# Patient Record
Sex: FEMALE | Race: WHITE | NOT HISPANIC OR LATINO
[De-identification: names, ages, dates, MRNs, and addresses within clinical notes are randomized per-mention and may not be internally consistent; named-entity substitution may affect disease eponyms.]

---

## 2020-06-11 ENCOUNTER — ERX REFILL RESPONSE (OUTPATIENT)
Age: 35
End: 2020-06-11

## 2020-06-11 RX ORDER — PANTOPRAZOLE SODIUM 40 MG/1
TAKE 1 TABLET BY MOUTH ONCE A DAY TABLET, DELAYED RELEASE ORAL
Qty: 30 | Refills: 2

## 2020-12-31 ENCOUNTER — OFFICE VISIT (OUTPATIENT)
Dept: URBAN - METROPOLITAN AREA CLINIC 12 | Facility: CLINIC | Age: 35
End: 2020-12-31

## 2021-02-04 ENCOUNTER — OFFICE VISIT (OUTPATIENT)
Dept: URBAN - METROPOLITAN AREA CLINIC 12 | Facility: CLINIC | Age: 36
End: 2021-02-04
Payer: COMMERCIAL

## 2021-02-04 VITALS
DIASTOLIC BLOOD PRESSURE: 81 MMHG | HEIGHT: 65 IN | TEMPERATURE: 97.3 F | HEART RATE: 89 BPM | BODY MASS INDEX: 27.79 KG/M2 | WEIGHT: 166.8 LBS | SYSTOLIC BLOOD PRESSURE: 138 MMHG

## 2021-02-04 DIAGNOSIS — R11.0 NAUSEA: ICD-10-CM

## 2021-02-04 DIAGNOSIS — R10.31 RLQ ABDOMINAL PAIN: ICD-10-CM

## 2021-02-04 DIAGNOSIS — K31.84 GASTROPARESIS: ICD-10-CM

## 2021-02-04 DIAGNOSIS — Z79.1 NSAID LONG-TERM USE: ICD-10-CM

## 2021-02-04 PROCEDURE — G9903 PT SCRN TBCO ID AS NON USER: HCPCS | Performed by: INTERNAL MEDICINE

## 2021-02-04 PROCEDURE — 99214 OFFICE O/P EST MOD 30 MIN: CPT | Performed by: INTERNAL MEDICINE

## 2021-02-04 PROCEDURE — G8482 FLU IMMUNIZE ORDER/ADMIN: HCPCS | Performed by: INTERNAL MEDICINE

## 2021-02-04 PROCEDURE — G8427 DOCREV CUR MEDS BY ELIG CLIN: HCPCS | Performed by: INTERNAL MEDICINE

## 2021-02-04 PROCEDURE — G8420 CALC BMI NORM PARAMETERS: HCPCS | Performed by: INTERNAL MEDICINE

## 2021-02-04 RX ORDER — ETONOGESTREL AND ETHINYL ESTRADIOL .12; .015 MG/D; MG/D
INSERT 1 VAGINAL RING BY VAGINAL ROUTE ONCE A MONTH LEAVE IN PLACE FOR 3 WEEKS, REMOVE FOR 1 WEEK INSERT, EXTENDED RELEASE VAGINAL
Qty: 1 | Refills: 0 | Status: ON HOLD | COMMUNITY
Start: 1900-01-01

## 2021-02-04 RX ORDER — TAPENTADOL HYDROCHLORIDE 50 MG/1
1 TABLET TABLET, FILM COATED ORAL
Status: ACTIVE | COMMUNITY

## 2021-02-04 RX ORDER — PANTOPRAZOLE SODIUM 40 MG/1
TAKE 1 TABLET BY MOUTH ONCE A DAY TABLET, DELAYED RELEASE ORAL
Qty: 30 | Refills: 2 | Status: ON HOLD | COMMUNITY

## 2021-02-04 RX ORDER — PANTOPRAZOLE SODIUM 40 MG/1
1 TABLET TABLET, DELAYED RELEASE ORAL 1
Qty: 90 | Refills: 3 | OUTPATIENT
Start: 2021-02-04

## 2021-02-04 RX ORDER — ONDANSETRON 8 MG/1
1 TABLET ON THE TONGUE AND ALLOW TO DISSOLVE TABLET, ORALLY DISINTEGRATING ORAL EVERY 6 HOURS
Qty: 360 TABLET | Refills: 3 | OUTPATIENT
Start: 2021-02-04

## 2021-02-04 RX ORDER — LEVOTHYROXINE SODIUM 0.07 MG/1
1 TABLET IN THE MORNING ON AN EMPTY STOMACH TABLET ORAL ONCE A DAY
Status: ACTIVE | COMMUNITY

## 2021-02-04 RX ORDER — DIPHENHYDRAMINE HCL 2 %
CREAM (GRAM) TOPICAL
Qty: 0 | Refills: 0 | Status: ON HOLD | COMMUNITY
Start: 1900-01-01

## 2021-02-04 RX ORDER — PREGABALIN 150 MG/1
1 CAPSULE CAPSULE ORAL ONCE A DAY
Status: ACTIVE | COMMUNITY

## 2021-02-04 NOTE — PHYSICAL EXAM NECK/THYROID:
normal appearance , without tenderness upon palpation , no deformities , trachea midline , Thyroid normal size , no thyroid nodules , no masses , no JVD , thyroid nontender [place] [facility] called for consult

## 2021-02-04 NOTE — HPI-TODAY'S VISIT:
35-year-old female presented for follow up for chronic reflux epigastric pain and history of gastroparesis. The patient reports still has heartburn epigastric pain mostly after she eats she recently started on Diclofenac  for arthritis and back  pain, she had complicated past medical history including p postural orthostatic tachycardia syndrome ,Siezure . She has history of for Mariano-Danlos syndrome. She has history of cholecystectomy complicated with a bile leak.  she had  Upper endoscopy and colonoscopy in 2019 her upper endoscopy revealed the ulcer in the duodenum her colonoscopy revealed anal fissure. she has post-prandial nasuea and bloating

## 2022-02-08 ENCOUNTER — ERX REFILL RESPONSE (OUTPATIENT)
Dept: URBAN - METROPOLITAN AREA CLINIC 23 | Facility: CLINIC | Age: 37
End: 2022-02-08

## 2022-02-08 RX ORDER — PANTOPRAZOLE 40 MG/1
TAKE 1 TABLET ONCE A DAY TABLET, DELAYED RELEASE ORAL
Qty: 90 TABLET | Refills: 4 | OUTPATIENT

## 2022-02-08 RX ORDER — PANTOPRAZOLE SODIUM 40 MG/1
1 TABLET TABLET, DELAYED RELEASE ORAL 1
Qty: 90 | Refills: 3 | OUTPATIENT

## 2022-05-08 ENCOUNTER — OFFICE VISIT (OUTPATIENT)
Dept: URBAN - METROPOLITAN AREA CLINIC 111 | Facility: CLINIC | Age: 37
End: 2022-05-08

## 2022-05-11 ENCOUNTER — WEB ENCOUNTER (OUTPATIENT)
Dept: URBAN - METROPOLITAN AREA CLINIC 111 | Facility: CLINIC | Age: 37
End: 2022-05-11

## 2022-05-11 ENCOUNTER — LAB OUTSIDE AN ENCOUNTER (OUTPATIENT)
Dept: URBAN - METROPOLITAN AREA CLINIC 111 | Facility: CLINIC | Age: 37
End: 2022-05-11

## 2022-05-11 ENCOUNTER — OFFICE VISIT (OUTPATIENT)
Dept: URBAN - METROPOLITAN AREA CLINIC 111 | Facility: CLINIC | Age: 37
End: 2022-05-11
Payer: COMMERCIAL

## 2022-05-11 DIAGNOSIS — R11.0 NAUSEA: ICD-10-CM

## 2022-05-11 DIAGNOSIS — R93.5 ABNORMAL CT OF THE ABDOMEN: ICD-10-CM

## 2022-05-11 DIAGNOSIS — Z95.0 PACEMAKER: ICD-10-CM

## 2022-05-11 DIAGNOSIS — K31.84 GASTROPARESIS: ICD-10-CM

## 2022-05-11 PROCEDURE — 99214 OFFICE O/P EST MOD 30 MIN: CPT | Performed by: INTERNAL MEDICINE

## 2022-05-11 RX ORDER — PREGABALIN 150 MG/1
1 CAPSULE CAPSULE ORAL ONCE A DAY
Status: ACTIVE | COMMUNITY

## 2022-05-11 RX ORDER — ERYTHROMYCIN 250 MG/1
ONE CAPSULE TABLET, DELAYED RELEASE ORAL THREE TIMES A DAY
Qty: 21 | Refills: 3 | OUTPATIENT

## 2022-05-11 RX ORDER — ETONOGESTREL AND ETHINYL ESTRADIOL .12; .015 MG/D; MG/D
INSERT 1 VAGINAL RING BY VAGINAL ROUTE ONCE A MONTH LEAVE IN PLACE FOR 3 WEEKS, REMOVE FOR 1 WEEK INSERT, EXTENDED RELEASE VAGINAL
Qty: 1 | Refills: 0 | Status: ON HOLD | COMMUNITY
Start: 1900-01-01

## 2022-05-11 RX ORDER — PANTOPRAZOLE 40 MG/1
TAKE 1 TABLET ONCE A DAY TABLET, DELAYED RELEASE ORAL
Qty: 90 TABLET | Refills: 4 | Status: ACTIVE | COMMUNITY

## 2022-05-11 RX ORDER — ONDANSETRON 8 MG/1
1 TABLET ON THE TONGUE AND ALLOW TO DISSOLVE TABLET, ORALLY DISINTEGRATING ORAL EVERY 6 HOURS
Qty: 360 TABLET | Refills: 3 | Status: ACTIVE | COMMUNITY
Start: 2021-02-04

## 2022-05-11 RX ORDER — LEVOTHYROXINE SODIUM 0.07 MG/1
1 TABLET IN THE MORNING ON AN EMPTY STOMACH TABLET ORAL ONCE A DAY
Status: ACTIVE | COMMUNITY

## 2022-05-11 RX ORDER — TAPENTADOL HYDROCHLORIDE 50 MG/1
1 TABLET TABLET, FILM COATED ORAL
Status: ACTIVE | COMMUNITY

## 2022-05-11 RX ORDER — DIPHENHYDRAMINE HCL 2 %
CREAM (GRAM) TOPICAL
Qty: 0 | Refills: 0 | Status: ON HOLD | COMMUNITY
Start: 1900-01-01

## 2022-05-11 NOTE — HPI-TODAY'S VISIT:
36-year-old female with complicated past ocular history including Mariano-Danlos syndrome history of pacemaker presented today for follow-up after recent emergency room visit May 5, 2022 for abdominal pain distention nausea she has CT scan in the emergency room revealed significant gastric distention consistent with a gastroparesis flare.  Her lab were unremarkable she is here today for follow-up she continued to have nausea bloating she is only tolerating liquid.  She cannot take Reglan because of side effect.  She had    she had complicated past medical history including p postural orthostatic tachycardia syndrome ,Siezure . She has history of for Mariano-Danlos syndrome. She has history of cholecystectomy complicated with a bile leak.  she had  Upper endoscopy and colonoscopy in 2019 her upper endoscopy revealed the ulcer in the duodenum her colonoscopy revealed anal fissure. She noted recently had this dysphagia and difficulty swallowing mostly for solid food

## 2022-05-13 PROBLEM — 441509002: Status: ACTIVE | Noted: 2022-05-13

## 2022-05-13 PROBLEM — 235675006 GASTROPARESIS: Status: ACTIVE | Noted: 2021-02-04

## 2022-05-27 ENCOUNTER — OFFICE VISIT (OUTPATIENT)
Dept: URBAN - METROPOLITAN AREA MEDICAL CENTER 27 | Facility: MEDICAL CENTER | Age: 37
End: 2022-05-27

## 2022-06-17 ENCOUNTER — OFFICE VISIT (OUTPATIENT)
Dept: URBAN - METROPOLITAN AREA MEDICAL CENTER 27 | Facility: MEDICAL CENTER | Age: 37
End: 2022-06-17

## 2022-06-17 PROBLEM — 40739000 DYSPHAGIA: Status: ACTIVE | Noted: 2022-05-11

## 2022-06-28 ENCOUNTER — OFFICE VISIT (OUTPATIENT)
Dept: URBAN - METROPOLITAN AREA MEDICAL CENTER 27 | Facility: MEDICAL CENTER | Age: 37
End: 2022-06-28
Payer: COMMERCIAL

## 2022-06-28 DIAGNOSIS — K29.60 ADENOPAPILLOMATOSIS GASTRICA: ICD-10-CM

## 2022-06-28 DIAGNOSIS — R13.19 CERVICAL DYSPHAGIA: ICD-10-CM

## 2022-06-28 DIAGNOSIS — R93.3 ABN FINDINGS-GI TRACT: ICD-10-CM

## 2022-06-28 PROCEDURE — 43239 EGD BIOPSY SINGLE/MULTIPLE: CPT | Performed by: INTERNAL MEDICINE

## 2022-06-28 RX ORDER — ONDANSETRON 8 MG/1
1 TABLET ON THE TONGUE AND ALLOW TO DISSOLVE TABLET, ORALLY DISINTEGRATING ORAL EVERY 6 HOURS
Qty: 360 TABLET | Refills: 3 | Status: ACTIVE | COMMUNITY
Start: 2021-02-04

## 2022-06-28 RX ORDER — DIPHENHYDRAMINE HCL 2 %
CREAM (GRAM) TOPICAL
Qty: 0 | Refills: 0 | Status: ON HOLD | COMMUNITY
Start: 1900-01-01

## 2022-06-28 RX ORDER — ETONOGESTREL AND ETHINYL ESTRADIOL .12; .015 MG/D; MG/D
INSERT 1 VAGINAL RING BY VAGINAL ROUTE ONCE A MONTH LEAVE IN PLACE FOR 3 WEEKS, REMOVE FOR 1 WEEK INSERT, EXTENDED RELEASE VAGINAL
Qty: 1 | Refills: 0 | Status: ON HOLD | COMMUNITY
Start: 1900-01-01

## 2022-06-28 RX ORDER — TAPENTADOL HYDROCHLORIDE 50 MG/1
1 TABLET TABLET, FILM COATED ORAL
Status: ACTIVE | COMMUNITY

## 2022-06-28 RX ORDER — ERYTHROMYCIN 250 MG/1
ONE CAPSULE TABLET, DELAYED RELEASE ORAL THREE TIMES A DAY
Qty: 21 | Refills: 3 | Status: ACTIVE | COMMUNITY

## 2022-06-28 RX ORDER — PREGABALIN 150 MG/1
1 CAPSULE CAPSULE ORAL ONCE A DAY
Status: ACTIVE | COMMUNITY

## 2022-06-28 RX ORDER — LEVOTHYROXINE SODIUM 0.07 MG/1
1 TABLET IN THE MORNING ON AN EMPTY STOMACH TABLET ORAL ONCE A DAY
Status: ACTIVE | COMMUNITY

## 2022-06-28 RX ORDER — PANTOPRAZOLE 40 MG/1
TAKE 1 TABLET ONCE A DAY TABLET, DELAYED RELEASE ORAL
Qty: 90 TABLET | Refills: 4 | Status: ACTIVE | COMMUNITY

## 2022-07-22 ENCOUNTER — OFFICE VISIT (OUTPATIENT)
Dept: URBAN - METROPOLITAN AREA MEDICAL CENTER 27 | Facility: MEDICAL CENTER | Age: 37
End: 2022-07-22

## 2022-11-03 ENCOUNTER — ERX REFILL RESPONSE (OUTPATIENT)
Dept: URBAN - METROPOLITAN AREA CLINIC 23 | Facility: CLINIC | Age: 37
End: 2022-11-03

## 2022-11-03 RX ORDER — ONDANSETRON 8 MG/1
ALLOW 1 TABLET TO DISSOLVE ON THE TONGUE EVERY 6 HOURS AS NEEDED FOR NAUSEA/VOMITING TABLET, ORALLY DISINTEGRATING ORAL
Qty: 30 TABLET | Refills: 1 | OUTPATIENT

## 2022-11-03 RX ORDER — ONDANSETRON 8 MG/1
ALLOW 1 TABLET TO DISSOLVE ON THE TONGUE EVERY 6 HOURS AS NEEDED FOR NAUSEA/VOMITING TABLET, ORALLY DISINTEGRATING ORAL
Qty: 30 TABLET | Refills: 2 | OUTPATIENT

## 2023-03-07 ENCOUNTER — ERX REFILL RESPONSE (OUTPATIENT)
Dept: URBAN - METROPOLITAN AREA CLINIC 23 | Facility: CLINIC | Age: 38
End: 2023-03-07

## 2023-03-07 RX ORDER — PANTOPRAZOLE 40 MG/1
TAKE 1 TABLET ONCE A DAY TABLET, DELAYED RELEASE ORAL
Qty: 90 TABLET | Refills: 4 | OUTPATIENT

## 2023-03-07 RX ORDER — PANTOPRAZOLE SODIUM 40 MG/1
TAKE 1 TABLET ONCE A DAY TABLET, DELAYED RELEASE ORAL
Qty: 90 TABLET | Refills: 3 | OUTPATIENT

## 2023-05-15 ENCOUNTER — ERX REFILL RESPONSE (OUTPATIENT)
Dept: URBAN - METROPOLITAN AREA CLINIC 23 | Facility: CLINIC | Age: 38
End: 2023-05-15

## 2023-05-15 RX ORDER — ONDANSETRON 8 MG/1
ALLOW 1 TABLET TO DISSOLVE ON THE TONGUE EVERY 6 HOURS AS NEEDED FOR NAUSEA/VOMITING TABLET, ORALLY DISINTEGRATING ORAL
Qty: 30 TABLET | Refills: 1 | OUTPATIENT

## 2023-08-18 ENCOUNTER — APPOINTMENT (RX ONLY)
Dept: URBAN - METROPOLITAN AREA OTHER 8 | Facility: OTHER | Age: 38
Setting detail: DERMATOLOGY
End: 2023-08-18

## 2023-08-18 DIAGNOSIS — L663 OTHER SPECIFIED DISEASES OF HAIR AND HAIR FOLLICLES: ICD-10-CM

## 2023-08-18 DIAGNOSIS — D18.0 HEMANGIOMA: ICD-10-CM

## 2023-08-18 DIAGNOSIS — D485 NEOPLASM OF UNCERTAIN BEHAVIOR OF SKIN: ICD-10-CM

## 2023-08-18 DIAGNOSIS — L738 OTHER SPECIFIED DISEASES OF HAIR AND HAIR FOLLICLES: ICD-10-CM

## 2023-08-18 DIAGNOSIS — B07.0 PLANTAR WART: ICD-10-CM

## 2023-08-18 DIAGNOSIS — L73.9 FOLLICULAR DISORDER, UNSPECIFIED: ICD-10-CM

## 2023-08-18 DIAGNOSIS — Z12.83 ENCOUNTER FOR SCREENING FOR MALIGNANT NEOPLASM OF SKIN: ICD-10-CM

## 2023-08-18 DIAGNOSIS — L70.8 OTHER ACNE: ICD-10-CM

## 2023-08-18 PROBLEM — D18.01 HEMANGIOMA OF SKIN AND SUBCUTANEOUS TISSUE: Status: ACTIVE | Noted: 2023-08-18

## 2023-08-18 PROBLEM — D48.5 NEOPLASM OF UNCERTAIN BEHAVIOR OF SKIN: Status: ACTIVE | Noted: 2023-08-18

## 2023-08-18 PROBLEM — L02.426 FURUNCLE OF LEFT LOWER LIMB: Status: ACTIVE | Noted: 2023-08-18

## 2023-08-18 PROBLEM — L02.425 FURUNCLE OF RIGHT LOWER LIMB: Status: ACTIVE | Noted: 2023-08-18

## 2023-08-18 PROBLEM — L02.32 FURUNCLE OF BUTTOCK: Status: ACTIVE | Noted: 2023-08-18

## 2023-08-18 PROCEDURE — ? LIQUID NITROGEN

## 2023-08-18 PROCEDURE — 99203 OFFICE O/P NEW LOW 30 MIN: CPT | Mod: 25

## 2023-08-18 PROCEDURE — 17110 DESTRUCTION B9 LES UP TO 14: CPT

## 2023-08-18 PROCEDURE — ? COUNSELING

## 2023-08-18 PROCEDURE — ? PRESCRIPTION MEDICATION MANAGEMENT

## 2023-08-18 PROCEDURE — ? PRESCRIPTION

## 2023-08-18 RX ORDER — CLINDAMYCIN PHOSPHATE 10 MG/ML
LOTION TOPICAL
Qty: 60 | Refills: 2 | Status: ERX | COMMUNITY
Start: 2023-08-18

## 2023-08-18 RX ORDER — CLASCOTERONE 1 G/100G
CREAM TOPICAL TWICE A DAY
Qty: 60 | Refills: 2 | Status: ERX | COMMUNITY
Start: 2023-08-18

## 2023-08-18 RX ADMIN — CLASCOTERONE: 1 CREAM TOPICAL at 00:00

## 2023-08-18 RX ADMIN — CLINDAMYCIN PHOSPHATE: 10 LOTION TOPICAL at 00:00

## 2023-08-18 ASSESSMENT — LOCATION SIMPLE DESCRIPTION DERM
LOCATION SIMPLE: RIGHT CHEEK
LOCATION SIMPLE: LEFT PLANTAR SURFACE
LOCATION SIMPLE: LEFT UPPER BACK
LOCATION SIMPLE: LOWER BACK
LOCATION SIMPLE: LEFT CHEEK
LOCATION SIMPLE: LEFT BUTTOCK
LOCATION SIMPLE: ABDOMEN
LOCATION SIMPLE: LEFT POPLITEAL SKIN
LOCATION SIMPLE: RIGHT CALF

## 2023-08-18 ASSESSMENT — LOCATION DETAILED DESCRIPTION DERM
LOCATION DETAILED: RIGHT LATERAL BUCCAL CHEEK
LOCATION DETAILED: LEFT LATERAL ABDOMEN
LOCATION DETAILED: SUPERIOR LUMBAR SPINE
LOCATION DETAILED: LEFT INFERIOR UPPER BACK
LOCATION DETAILED: LEFT CENTRAL BUCCAL CHEEK
LOCATION DETAILED: LEFT POPLITEAL SKIN
LOCATION DETAILED: LEFT PLANTAR FOREFOOT OVERLYING 2ND METATARSAL
LOCATION DETAILED: RIGHT PROXIMAL CALF
LOCATION DETAILED: LEFT BUTTOCK

## 2023-08-18 ASSESSMENT — LOCATION ZONE DERM
LOCATION ZONE: LEG
LOCATION ZONE: TRUNK
LOCATION ZONE: FACE
LOCATION ZONE: FEET

## 2023-08-18 NOTE — PROCEDURE: COUNSELING
Detail Level: Detailed
Detail Level: Zone
Sarecycline Pregnancy And Lactation Text: This medication is Pregnancy Category D and not consider safe during pregnancy. It is also excreted in breast milk.
Bactrim Pregnancy And Lactation Text: This medication is Pregnancy Category D and is known to cause fetal risk.  It is also excreted in breast milk.
Azelaic Acid Counseling: Patient counseled that medicine may cause skin irritation and to avoid applying near the eyes.  In the event of skin irritation, the patient was advised to reduce the amount of the drug applied or use it less frequently.   The patient verbalized understanding of the proper use and possible adverse effects of azelaic acid.  All of the patient's questions and concerns were addressed.
Tazorac Pregnancy And Lactation Text: This medication is not safe during pregnancy. It is unknown if this medication is excreted in breast milk.
Erythromycin Counseling:  I discussed with the patient the risks of erythromycin including but not limited to GI upset, allergic reaction, drug rash, diarrhea, increase in liver enzymes, and yeast infections.
Isotretinoin Counseling: Patient should get monthly blood tests, not donate blood, not drive at night if vision affected, not share medication, and not undergo elective surgery for 6 months after tx completed. Side effects reviewed, pt to contact office should one occur.
Topical Retinoid counseling:  Patient advised to apply a pea-sized amount only at bedtime and wait 30 minutes after washing their face before applying.  If too drying, patient may add a non-comedogenic moisturizer. The patient verbalized understanding of the proper use and possible adverse effects of retinoids.  All of the patient's questions and concerns were addressed.
Birth Control Pills Pregnancy And Lactation Text: This medication should be avoided if pregnant and for the first 30 days post-partum.
Benzoyl Peroxide Counseling: Patient counseled that medicine may cause skin irritation and bleach clothing.  In the event of skin irritation, the patient was advised to reduce the amount of the drug applied or use it less frequently.   The patient verbalized understanding of the proper use and possible adverse effects of benzoyl peroxide.  All of the patient's questions and concerns were addressed.
Topical Clindamycin Pregnancy And Lactation Text: This medication is Pregnancy Category B and is considered safe during pregnancy. It is unknown if it is excreted in breast milk.
Azithromycin Pregnancy And Lactation Text: This medication is considered safe during pregnancy and is also secreted in breast milk.
Topical Retinoid Pregnancy And Lactation Text: This medication is Pregnancy Category C. It is unknown if this medication is excreted in breast milk.
Doxycycline Counseling:  Patient counseled regarding possible photosensitivity and increased risk for sunburn.  Patient instructed to avoid sunlight, if possible.  When exposed to sunlight, patients should wear protective clothing, sunglasses, and sunscreen.  The patient was instructed to call the office immediately if the following severe adverse effects occur:  hearing changes, easy bruising/bleeding, severe headache, or vision changes.  The patient verbalized understanding of the proper use and possible adverse effects of doxycycline.  All of the patient's questions and concerns were addressed.
High Dose Vitamin A Pregnancy And Lactation Text: High dose vitamin A therapy is contraindicated during pregnancy and breast feeding.
Tetracycline Counseling: Patient counseled regarding possible photosensitivity and increased risk for sunburn.  Patient instructed to avoid sunlight, if possible.  When exposed to sunlight, patients should wear protective clothing, sunglasses, and sunscreen.  The patient was instructed to call the office immediately if the following severe adverse effects occur:  hearing changes, easy bruising/bleeding, severe headache, or vision changes.  The patient verbalized understanding of the proper use and possible adverse effects of tetracycline.  All of the patient's questions and concerns were addressed. Patient understands to avoid pregnancy while on therapy due to potential birth defects.
Benzoyl Peroxide Pregnancy And Lactation Text: This medication is Pregnancy Category C. It is unknown if benzoyl peroxide is excreted in breast milk.
Isotretinoin Pregnancy And Lactation Text: This medication is Pregnancy Category X and is considered extremely dangerous during pregnancy. It is unknown if it is excreted in breast milk.
Dapsone Counseling: I discussed with the patient the risks of dapsone including but not limited to hemolytic anemia, agranulocytosis, rashes, methemoglobinemia, kidney failure, peripheral neuropathy, headaches, GI upset, and liver toxicity.  Patients who start dapsone require monitoring including baseline LFTs and weekly CBCs for the first month, then every month thereafter.  The patient verbalized understanding of the proper use and possible adverse effects of dapsone.  All of the patient's questions and concerns were addressed.
Winlevi Counseling:  I discussed with the patient the risks of topical clascoterone including but not limited to erythema, scaling, itching, and stinging. Patient voiced their understanding.
Include Pregnancy/Lactation Warning?: No
Aklief counseling:  Patient advised to apply a pea-sized amount only at bedtime and wait 30 minutes after washing their face before applying.  If too drying, patient may add a non-comedogenic moisturizer.  The most commonly reported side effects including irritation, redness, scaling, dryness, stinging, burning, itching, and increased risk of sunburn.  The patient verbalized understanding of the proper use and possible adverse effects of retinoids.  All of the patient's questions and concerns were addressed.
Azelaic Acid Pregnancy And Lactation Text: This medication is considered safe during pregnancy and breast feeding.
Topical Clindamycin Counseling: Patient counseled that this medication may cause skin irritation or allergic reactions.  In the event of skin irritation, the patient was advised to reduce the amount of the drug applied or use it less frequently.   The patient verbalized understanding of the proper use and possible adverse effects of clindamycin.  All of the patient's questions and concerns were addressed.
Erythromycin Pregnancy And Lactation Text: This medication is Pregnancy Category B and is considered safe during pregnancy. It is also excreted in breast milk.
Birth Control Pills Counseling: Birth Control Pill Counseling: I discussed with the patient the potential side effects of OCPs including but not limited to increased risk of stroke, heart attack, thrombophlebitis, deep venous thrombosis, hepatic adenomas, breast changes, GI upset, headaches, and depression.  The patient verbalized understanding of the proper use and possible adverse effects of OCPs. All of the patient's questions and concerns were addressed.
Sarecycline Counseling: Patient advised regarding possible photosensitivity and discoloration of the teeth, skin, lips, tongue and gums.  Patient instructed to avoid sunlight, if possible.  When exposed to sunlight, patients should wear protective clothing, sunglasses, and sunscreen.  The patient was instructed to call the office immediately if the following severe adverse effects occur:  hearing changes, easy bruising/bleeding, severe headache, or vision changes.  The patient verbalized understanding of the proper use and possible adverse effects of sarecycline.  All of the patient's questions and concerns were addressed.
Aklief Pregnancy And Lactation Text: It is unknown if this medication is safe to use during pregnancy.  It is unknown if this medication is excreted in breast milk.  Breastfeeding women should use the topical cream on the smallest area of the skin for the shortest time needed while breastfeeding.  Do not apply to nipple and areola.
Topical Sulfur Applications Counseling: Topical Sulfur Counseling: Patient counseled that this medication may cause skin irritation or allergic reactions.  In the event of skin irritation, the patient was advised to reduce the amount of the drug applied or use it less frequently.   The patient verbalized understanding of the proper use and possible adverse effects of topical sulfur application.  All of the patient's questions and concerns were addressed.
Spironolactone Counseling: Patient advised regarding risks of diarrhea, abdominal pain, hyperkalemia, birth defects (for female patients), liver toxicity and renal toxicity. The patient may need blood work to monitor liver and kidney function and potassium levels while on therapy. The patient verbalized understanding of the proper use and possible adverse effects of spironolactone.  All of the patient's questions and concerns were addressed.
Azithromycin Counseling:  I discussed with the patient the risks of azithromycin including but not limited to GI upset, allergic reaction, drug rash, diarrhea, and yeast infections.
Dapsone Pregnancy And Lactation Text: This medication is Pregnancy Category C and is not considered safe during pregnancy or breast feeding.
Topical Sulfur Applications Pregnancy And Lactation Text: This medication is Pregnancy Category C and has an unknown safety profile during pregnancy. It is unknown if this topical medication is excreted in breast milk.
High Dose Vitamin A Counseling: Side effects reviewed, pt to contact office should one occur.
Spironolactone Pregnancy And Lactation Text: This medication can cause feminization of the male fetus and should be avoided during pregnancy. The active metabolite is also found in breast milk.
Winlevi Pregnancy And Lactation Text: This medication is considered safe during pregnancy and breastfeeding.
Tazorac Counseling:  Patient advised that medication is irritating and drying.  Patient may need to apply sparingly and wash off after an hour before eventually leaving it on overnight.  The patient verbalized understanding of the proper use and possible adverse effects of tazorac.  All of the patient's questions and concerns were addressed.
Doxycycline Pregnancy And Lactation Text: This medication is Pregnancy Category D and not consider safe during pregnancy. It is also excreted in breast milk but is considered safe for shorter treatment courses.
Minocycline Counseling: Patient advised regarding possible photosensitivity and discoloration of the teeth, skin, lips, tongue and gums.  Patient instructed to avoid sunlight, if possible.  When exposed to sunlight, patients should wear protective clothing, sunglasses, and sunscreen.  The patient was instructed to call the office immediately if the following severe adverse effects occur:  hearing changes, easy bruising/bleeding, severe headache, or vision changes.  The patient verbalized understanding of the proper use and possible adverse effects of minocycline.  All of the patient's questions and concerns were addressed.
Bactrim Counseling:  I discussed with the patient the risks of sulfa antibiotics including but not limited to GI upset, allergic reaction, drug rash, diarrhea, dizziness, photosensitivity, and yeast infections.  Rarely, more serious reactions can occur including but not limited to aplastic anemia, agranulocytosis, methemoglobinemia, blood dyscrasias, liver or kidney failure, lung infiltrates or desquamative/blistering drug rashes.
Detail Level: Generalized
Detail Level: Simple

## 2023-08-18 NOTE — PROCEDURE: LIQUID NITROGEN
Show Spray Paint Technique Variable?: Yes
Detail Level: Detailed
Application Tool (Optional): Liquid Nitrogen Sprayer
Consent: The patient's consent was obtained including but not limited to risks of crusting, scabbing, blistering, scarring, darker or lighter pigmentary change, recurrence, incomplete removal and infection.
Medical Necessity Information: It is in your best interest to select a reason for this procedure from the list below. All of these items fulfill various CMS LCD requirements except the new and changing color options.
Medical Necessity Clause: This procedure was medically necessary because the lesions that were treated were:
Add 52 Modifier (Optional): no
Spray Paint Text: The liquid nitrogen was applied to the skin utilizing a spray paint frosting technique.
Number Of Freeze-Thaw Cycles: 2 freeze-thaw cycles
Duration Of Freeze Thaw-Cycle (Seconds): 5-10
Post-Care Instructions: I reviewed with the patient in detail post-care instructions. Patient is to wear sunprotection, and avoid picking at any of the treated lesions. Pt may apply Vaseline to crusted or scabbing areas.
Number Of Freeze-Thaw Cycles: 1 freeze-thaw cycle

## 2023-08-18 NOTE — PROCEDURE: PRESCRIPTION MEDICATION MANAGEMENT
Initiate Treatment: clindamycin 1 % lotion Apply to affected area on the left lower leg daily until resolved.\\n\\nRecommended otc benzoyl peroxide was in the shower \\n\\nRecommended cleansing razor blade with alcohol and shaving in the direction the hair grows in.
Render In Strict Bullet Format?: No
Detail Level: Zone

## 2023-08-18 NOTE — HPI: SKIN LESION
What Type Of Note Output Would You Prefer (Optional)?: Standard Output
How Severe Is Your Skin Lesion?: mild
Is This A New Presentation, Or A Follow-Up?: Skin Lesion
Additional History: Patient has tried treating with otc compound W
Has Your Skin Lesion Been Treated?: not been treated
Is This A New Presentation, Or A Follow-Up?: Mole

## 2024-01-29 ENCOUNTER — OFFICE VISIT (OUTPATIENT)
Dept: URBAN - METROPOLITAN AREA CLINIC 111 | Facility: CLINIC | Age: 39
End: 2024-01-29
Payer: COMMERCIAL

## 2024-01-29 ENCOUNTER — LAB OUTSIDE AN ENCOUNTER (OUTPATIENT)
Dept: URBAN - METROPOLITAN AREA CLINIC 111 | Facility: CLINIC | Age: 39
End: 2024-01-29

## 2024-01-29 VITALS
SYSTOLIC BLOOD PRESSURE: 115 MMHG | HEIGHT: 65 IN | DIASTOLIC BLOOD PRESSURE: 79 MMHG | HEART RATE: 109 BPM | BODY MASS INDEX: 33.99 KG/M2 | TEMPERATURE: 97.5 F | WEIGHT: 204 LBS

## 2024-01-29 DIAGNOSIS — R13.19 CERVICAL DYSPHAGIA: ICD-10-CM

## 2024-01-29 DIAGNOSIS — K64.8 EXTERNAL HEMORRHOIDS: ICD-10-CM

## 2024-01-29 DIAGNOSIS — R74.8 ABNORMAL ALKALINE PHOSPHATASE TEST: ICD-10-CM

## 2024-01-29 DIAGNOSIS — K62.5 RECTAL BLEEDING: ICD-10-CM

## 2024-01-29 PROBLEM — 40739000: Status: ACTIVE | Noted: 2024-01-29

## 2024-01-29 PROBLEM — 14760008: Status: ACTIVE | Noted: 2024-01-29

## 2024-01-29 PROCEDURE — 99214 OFFICE O/P EST MOD 30 MIN: CPT | Performed by: PHYSICIAN ASSISTANT

## 2024-01-29 RX ORDER — PREGABALIN 150 MG/1
1 CAPSULE CAPSULE ORAL ONCE A DAY
Status: ACTIVE | COMMUNITY

## 2024-01-29 RX ORDER — PANTOPRAZOLE SODIUM 40 MG/1
TAKE 1 TABLET ONCE A DAY TABLET, DELAYED RELEASE ORAL
Qty: 90 TABLET | Refills: 3 | Status: ACTIVE | COMMUNITY

## 2024-01-29 RX ORDER — ERYTHROMYCIN 250 MG/1
ONE CAPSULE TABLET, DELAYED RELEASE ORAL THREE TIMES A DAY
Qty: 21 | Refills: 3 | Status: ACTIVE | COMMUNITY

## 2024-01-29 RX ORDER — TAPENTADOL HYDROCHLORIDE 50 MG/1
1 TABLET TABLET, FILM COATED ORAL
Status: ACTIVE | COMMUNITY

## 2024-01-29 RX ORDER — LEVOTHYROXINE SODIUM 0.07 MG/1
1 TABLET IN THE MORNING ON AN EMPTY STOMACH TABLET ORAL ONCE A DAY
Status: ACTIVE | COMMUNITY

## 2024-01-29 RX ORDER — ETONOGESTREL AND ETHINYL ESTRADIOL .12; .015 MG/D; MG/D
INSERT 1 VAGINAL RING BY VAGINAL ROUTE ONCE A MONTH LEAVE IN PLACE FOR 3 WEEKS, REMOVE FOR 1 WEEK INSERT, EXTENDED RELEASE VAGINAL
Qty: 1 | Refills: 0 | Status: ON HOLD | COMMUNITY
Start: 1900-01-01

## 2024-01-29 RX ORDER — DIPHENHYDRAMINE HCL 2 %
CREAM (GRAM) TOPICAL
Qty: 0 | Refills: 0 | Status: ON HOLD | COMMUNITY
Start: 1900-01-01

## 2024-01-29 RX ORDER — ONDANSETRON 8 MG/1
ALLOW 1 TABLET TO DISSOLVE ON THE TONGUE EVERY 6 HOURS AS NEEDED FOR NAUSEA/VOMITING TABLET, ORALLY DISINTEGRATING ORAL
Qty: 30 TABLET | Refills: 1 | Status: ACTIVE | COMMUNITY

## 2024-01-29 NOTE — PHYSICAL EXAM GASTROINTESTINAL
Abdomen, soft, mild tenderness diffusely, nondistended, no guarding or rigidity, no masses palpable, normal bowel sounds, Liver and Spleen, no hepatosplenomegaly

## 2024-01-29 NOTE — HPI-TODAY'S VISIT:
37 y/o female here with rectal bleeding. She has complicated PMH including postural orthostatic tachycardia syndrome, seizure, and Mariano-Danlos syndrome. She has history of cholecystectomy complicated with a bile leak and needed ERCP. S/p colon by Dr. Calderón in 10/2019 for hematochezia revealing anal fissure and non-bleeding internal hemorrhoids. S/p EGD in 6/2022 revealing mild gastritis. Path unimpressive.  Pt has a pacemaker designed for POTS. Sees Dr. Mathis.  Pt is here with her . States she is not doing well and worried about forgetting stuff d/t brain injury. Having a lot of rectal bleeding and happening for at least a year. She states she has had bad hemorrhoids. Also reports itching and pain at times. She has tried creams. Usually constipated d/t pain meds. Having a stool about 3 times a week. She takes miralax if she goes 2 days without a stool. Does not eat meat but eats fish. Tries to eat a lot of fiber. Sometimes stool is hard and she will need to strain. Having abd pain and it has worsened in the last 2 days. Pain moves around. No weight loss but reports a lot of weight gain.  Reports birth father had colon cancer as well as adoptive father. Alk phos has been going up she states.  Reports a lot of fatigue.   No blood thinners.  Having some heartburn. Following a low reflux diet. Takes PPI daily. Having dysphagia for a couple of years. Noticed it initially with pills. Will need to drink water to help. Sometimes will vomit. She had dysphagia back when she had the EGD in 2022 as well. It has not gotten better.  Reports h/o ulcer and feels some pain that was similar to when she had her ulcer.

## 2024-02-02 ENCOUNTER — APPOINTMENT (RX ONLY)
Dept: URBAN - METROPOLITAN AREA OTHER 8 | Facility: OTHER | Age: 39
Setting detail: DERMATOLOGY
End: 2024-02-02

## 2024-02-02 DIAGNOSIS — L73.9 FOLLICULAR DISORDER, UNSPECIFIED: ICD-10-CM

## 2024-02-02 DIAGNOSIS — R21 RASH AND OTHER NONSPECIFIC SKIN ERUPTION: ICD-10-CM

## 2024-02-02 DIAGNOSIS — L738 OTHER SPECIFIED DISEASES OF HAIR AND HAIR FOLLICLES: ICD-10-CM

## 2024-02-02 DIAGNOSIS — L663 OTHER SPECIFIED DISEASES OF HAIR AND HAIR FOLLICLES: ICD-10-CM

## 2024-02-02 PROBLEM — L02.821 FURUNCLE OF HEAD [ANY PART, EXCEPT FACE]: Status: ACTIVE | Noted: 2024-02-02

## 2024-02-02 PROCEDURE — ? PRESCRIPTION MEDICATION MANAGEMENT

## 2024-02-02 PROCEDURE — ? TREATMENT REGIMEN

## 2024-02-02 PROCEDURE — ? PRESCRIPTION

## 2024-02-02 PROCEDURE — ? COUNSELING

## 2024-02-02 PROCEDURE — 11104 PUNCH BX SKIN SINGLE LESION: CPT

## 2024-02-02 PROCEDURE — 99213 OFFICE O/P EST LOW 20 MIN: CPT | Mod: 25

## 2024-02-02 PROCEDURE — ? BIOPSY BY PUNCH METHOD FOR DIF

## 2024-02-02 PROCEDURE — ? BIOPSY BY PUNCH METHOD

## 2024-02-02 PROCEDURE — 11105 PUNCH BX SKIN EA SEP/ADDL: CPT

## 2024-02-02 RX ORDER — ERYTHROMYCIN 20 MG/ML
SOLUTION TOPICAL
Qty: 60 | Refills: 1 | Status: ERX | COMMUNITY
Start: 2024-02-02

## 2024-02-02 RX ORDER — MUPIROCIN 20 MG/G
OINTMENT TOPICAL
Qty: 22 | Refills: 1 | Status: ERX | COMMUNITY
Start: 2024-02-02

## 2024-02-02 RX ORDER — TRIAMCINOLONE ACETONIDE 1 MG/G
OINTMENT TOPICAL BID
Qty: 454 | Refills: 1 | Status: ERX | COMMUNITY
Start: 2024-02-02

## 2024-02-02 RX ADMIN — ERYTHROMYCIN: 20 SOLUTION TOPICAL at 00:00

## 2024-02-02 RX ADMIN — MUPIROCIN: 20 OINTMENT TOPICAL at 00:00

## 2024-02-02 RX ADMIN — TRIAMCINOLONE ACETONIDE: 1 OINTMENT TOPICAL at 00:00

## 2024-02-02 ASSESSMENT — LOCATION DETAILED DESCRIPTION DERM
LOCATION DETAILED: RIGHT ANTERIOR PROXIMAL THIGH
LOCATION DETAILED: LEFT ANTERIOR PROXIMAL THIGH
LOCATION DETAILED: LEFT ANTERIOR PROXIMAL UPPER ARM
LOCATION DETAILED: RIGHT CENTRAL POSTAURICULAR SKIN
LOCATION DETAILED: RIGHT ANTERIOR SHOULDER
LOCATION DETAILED: LEFT RIB CAGE

## 2024-02-02 ASSESSMENT — LOCATION SIMPLE DESCRIPTION DERM
LOCATION SIMPLE: SCALP
LOCATION SIMPLE: LEFT UPPER ARM
LOCATION SIMPLE: LEFT THIGH
LOCATION SIMPLE: RIGHT SHOULDER
LOCATION SIMPLE: ABDOMEN
LOCATION SIMPLE: RIGHT THIGH

## 2024-02-02 ASSESSMENT — LOCATION ZONE DERM
LOCATION ZONE: TRUNK
LOCATION ZONE: SCALP
LOCATION ZONE: LEG
LOCATION ZONE: ARM

## 2024-02-02 NOTE — PROCEDURE: BIOPSY BY PUNCH METHOD
Body Location Override (Optional - Billing Will Still Be Based On Selected Body Map Location If Applicable): left inferior anterior proximal thigh
Detail Level: Detailed
Was A Bandage Applied: Yes
Punch Size In Mm: 4
Size Of Lesion In Cm (Optional): 0
Depth Of Punch Biopsy: dermis
Biopsy Type: H and E
Anesthesia Type: bacteriostatic saline
Anesthesia Volume In Cc: 0.5
Hemostasis: None
Epidermal Sutures: 4-0 Ethilon
Wound Care: Mupirocin
Dressing: bandage
Suture Removal: 14 days
Patient Will Remove Sutures At Home?: No
Lab: [id number]
Lab Facility: [id number]
Consent: Written consent was obtained and risks were reviewed including but not limited to scarring, infection, bleeding, scabbing, incomplete removal, nerve damage and allergy to anesthesia.
Post-Care Instructions: I reviewed with the patient in detail post-care instructions. Patient is to keep the biopsy site dry overnight, and then apply bacitracin twice daily until healed. Patient may apply hydrogen peroxide soaks to remove any crusting.
Home Suture Removal Text: Patient was provided a home suture removal kit and will remove their sutures at home.  If they have any questions or difficulties they will call the office.
Notification Instructions: Patient will be notified of biopsy results. However, patient instructed to call the office if not contacted within 2 weeks.
Billing Type: Third-Party Bill
Information: Selecting Yes will display possible errors in your note based on the variables you have selected. This validation is only offered as a suggestion for you. PLEASE NOTE THAT THE VALIDATION TEXT WILL BE REMOVED WHEN YOU FINALIZE YOUR NOTE. IF YOU WANT TO FAX A PRELIMINARY NOTE YOU WILL NEED TO TOGGLE THIS TO 'NO' IF YOU DO NOT WANT IT IN YOUR FAXED NOTE.

## 2024-02-02 NOTE — HPI: RASH
What Type Of Note Output Would You Prefer (Optional)?: Bullet Format
How Severe Is Your Rash?: mild
Is This A New Presentation, Or A Follow-Up?: Rash
Additional History: Winnie positive lab test but negative cascade blood test. \\nRash comes and goes. Rash changes locations on the hour\\nRsv 8 weeks ago

## 2024-02-02 NOTE — PROCEDURE: PRESCRIPTION MEDICATION MANAGEMENT
Initiate Treatment: erythromycin solution apply twice daily to inflamed blemish on scalp
Detail Level: Zone
Render In Strict Bullet Format?: No
Initiate Treatment: Mupirocin ointment twice daily to bx sites until healed \\nTMC 0.1% ointment twice daily to rash on body until improved

## 2024-02-02 NOTE — PROCEDURE: BIOPSY BY PUNCH METHOD FOR DIF
Body Location Override (Optional - Billing Will Still Be Based On Selected Body Map Location If Applicable): left superior anterior proximal thigh
Detail Level: Detailed
Was A Bandage Applied: Yes
Punch Size In Mm: 4
Size Of Lesion In Cm (Optional): 0
Depth Of Punch Biopsy: dermis
Biopsy Type: DIF
Anesthesia Type: bacteriostatic saline
Anesthesia Volume In Cc: 0.5
Hemostasis: None
Epidermal Sutures: 4-0 Ethilon
Wound Care: Petrolatum
Dressing: bandage
Suture Removal: 14 days
Patient Will Remove Sutures At Home?: No
Consent: Written consent was obtained and risks were reviewed including but not limited to scarring, infection, bleeding, scabbing, incomplete removal, nerve damage and allergy to anesthesia.
Post-Care Instructions: I reviewed with the patient in detail post-care instructions. Patient is to keep the biopsy site dry overnight, and then apply bacitracin twice daily until healed. Patient may apply hydrogen peroxide soaks to remove any crusting.
Home Suture Removal Text: Patient was provided a home suture removal kit and will remove their sutures at home.  If they have any questions or difficulties they will call the office.
Notification Instructions: Patient will be notified of biopsy results. However, patient instructed to call the office if not contacted within 2 weeks.
Billing Type: Third-Party Bill

## 2024-02-06 LAB
ALBUMIN/GLOBULIN RATIO: 1.7
ALBUMIN: 4.4
ALKALINE PHOSPHATASE: 200
ALT (SGPT): 19
AST (SGOT): 18
BILIRUBIN, DIRECT: 0.1
BILIRUBIN, INDIRECT: 0.3
BILIRUBIN, TOTAL: 0.4
GGT: 23
GLOBULIN: 2.6
PROTEIN, TOTAL: 7

## 2024-02-16 ENCOUNTER — APPOINTMENT (RX ONLY)
Dept: URBAN - METROPOLITAN AREA OTHER 8 | Facility: OTHER | Age: 39
Setting detail: DERMATOLOGY
End: 2024-02-16

## 2024-02-16 DIAGNOSIS — Z48.02 ENCOUNTER FOR REMOVAL OF SUTURES: ICD-10-CM

## 2024-02-16 DIAGNOSIS — L50.1 IDIOPATHIC URTICARIA: ICD-10-CM | Status: RESOLVED

## 2024-02-16 PROCEDURE — ? ADDITIONAL NOTES

## 2024-02-16 PROCEDURE — ? SUTURE REMOVAL (NO GLOBAL PERIOD)

## 2024-02-16 PROCEDURE — ? PATHOLOGY DISCUSSION

## 2024-02-16 PROCEDURE — ? COUNSELING

## 2024-02-16 PROCEDURE — 99213 OFFICE O/P EST LOW 20 MIN: CPT

## 2024-02-16 PROCEDURE — ? PRESCRIPTION MEDICATION MANAGEMENT

## 2024-02-16 ASSESSMENT — LOCATION DETAILED DESCRIPTION DERM
LOCATION DETAILED: LEFT ANTERIOR PROXIMAL THIGH
LOCATION DETAILED: RIGHT ANTERIOR PROXIMAL THIGH
LOCATION DETAILED: PERIUMBILICAL SKIN
LOCATION DETAILED: LEFT ANTERIOR DISTAL UPPER ARM
LOCATION DETAILED: RIGHT ANTERIOR DISTAL UPPER ARM

## 2024-02-16 ASSESSMENT — LOCATION ZONE DERM
LOCATION ZONE: TRUNK
LOCATION ZONE: LEG
LOCATION ZONE: ARM

## 2024-02-16 ASSESSMENT — LOCATION SIMPLE DESCRIPTION DERM
LOCATION SIMPLE: LEFT THIGH
LOCATION SIMPLE: RIGHT THIGH
LOCATION SIMPLE: RIGHT UPPER ARM
LOCATION SIMPLE: LEFT UPPER ARM
LOCATION SIMPLE: ABDOMEN

## 2024-02-16 NOTE — PROCEDURE: ADDITIONAL NOTES
Additional Notes: Pt asked provider if she could take out her own sutures. Provider gave the ok but under the supervision of the present MA
Render Risk Assessment In Note?: no
Detail Level: Simple

## 2024-02-16 NOTE — PROCEDURE: SUTURE REMOVAL (NO GLOBAL PERIOD)
Detail Level: Detailed
Add 1585x Cpt? (Do Not Bill If You Billed For The Procedure Placing The Sutures. This Is An Add-On Code That Must Be Billed With An E/M Visit Code): No
Suture Removal Completed By (Optional): patient

## 2024-02-16 NOTE — PROCEDURE: PRESCRIPTION MEDICATION MANAGEMENT
Detail Level: Zone
Continue Regimen: Zyrtec 10 mg twice daily, but Discussed with patient if needed can uptitrate to 20 mg BID\\nPepcid once daily \\nBenadryl as needed in the evening
Render In Strict Bullet Format?: No

## 2024-03-26 ENCOUNTER — COL/EGD (OUTPATIENT)
Dept: URBAN - METROPOLITAN AREA MEDICAL CENTER 27 | Facility: MEDICAL CENTER | Age: 39
End: 2024-03-26

## 2024-03-26 RX ORDER — ETONOGESTREL AND ETHINYL ESTRADIOL .12; .015 MG/D; MG/D
INSERT 1 VAGINAL RING BY VAGINAL ROUTE ONCE A MONTH LEAVE IN PLACE FOR 3 WEEKS, REMOVE FOR 1 WEEK INSERT, EXTENDED RELEASE VAGINAL
Qty: 1 | Refills: 0 | Status: ON HOLD | COMMUNITY
Start: 1900-01-01

## 2024-03-26 RX ORDER — TAPENTADOL HYDROCHLORIDE 50 MG/1
1 TABLET TABLET, FILM COATED ORAL
Status: ACTIVE | COMMUNITY

## 2024-03-26 RX ORDER — DIPHENHYDRAMINE HCL 2 %
CREAM (GRAM) TOPICAL
Qty: 0 | Refills: 0 | Status: ON HOLD | COMMUNITY
Start: 1900-01-01

## 2024-03-26 RX ORDER — LEVOTHYROXINE SODIUM 0.07 MG/1
1 TABLET IN THE MORNING ON AN EMPTY STOMACH TABLET ORAL ONCE A DAY
Status: ACTIVE | COMMUNITY

## 2024-03-26 RX ORDER — ONDANSETRON 8 MG/1
ALLOW 1 TABLET TO DISSOLVE ON THE TONGUE EVERY 6 HOURS AS NEEDED FOR NAUSEA/VOMITING TABLET, ORALLY DISINTEGRATING ORAL
Qty: 30 TABLET | Refills: 1 | Status: ACTIVE | COMMUNITY

## 2024-03-26 RX ORDER — ONDANSETRON HYDROCHLORIDE 4 MG/1
1 TABLET TABLET, FILM COATED ORAL EVERY 6 HOURS
Qty: 120 TABLET | Refills: 1 | Status: ACTIVE | COMMUNITY
Start: 2024-03-11

## 2024-03-26 RX ORDER — ERYTHROMYCIN 250 MG/1
ONE CAPSULE TABLET, DELAYED RELEASE ORAL THREE TIMES A DAY
Qty: 21 | Refills: 3 | Status: ACTIVE | COMMUNITY

## 2024-03-26 RX ORDER — PREGABALIN 150 MG/1
1 CAPSULE CAPSULE ORAL ONCE A DAY
Status: ACTIVE | COMMUNITY

## 2024-03-26 RX ORDER — PANTOPRAZOLE SODIUM 40 MG/1
TAKE 1 TABLET ONCE A DAY TABLET, DELAYED RELEASE ORAL
Qty: 90 TABLET | Refills: 3 | Status: ACTIVE | COMMUNITY

## 2024-04-17 ENCOUNTER — LAB (OUTPATIENT)
Dept: URBAN - METROPOLITAN AREA CLINIC 111 | Facility: CLINIC | Age: 39
End: 2024-04-17

## 2024-07-03 ENCOUNTER — OFFICE VISIT (OUTPATIENT)
Dept: CARDIOLOGY | Facility: HOSPITAL | Age: 39
End: 2024-07-03
Payer: COMMERCIAL

## 2024-07-03 ENCOUNTER — HOSPITAL ENCOUNTER (OUTPATIENT)
Dept: CARDIOLOGY | Facility: HOSPITAL | Age: 39
Discharge: HOME | End: 2024-07-03
Payer: COMMERCIAL

## 2024-07-03 VITALS — DIASTOLIC BLOOD PRESSURE: 80 MMHG | OXYGEN SATURATION: 96 % | HEART RATE: 84 BPM | SYSTOLIC BLOOD PRESSURE: 116 MMHG

## 2024-07-03 DIAGNOSIS — Z95.0 PACEMAKER: ICD-10-CM

## 2024-07-03 DIAGNOSIS — G90.A POTS (POSTURAL ORTHOSTATIC TACHYCARDIA SYNDROME): ICD-10-CM

## 2024-07-03 DIAGNOSIS — R94.31 ABNORMAL EKG: Primary | ICD-10-CM

## 2024-07-03 DIAGNOSIS — I48.11 LONGSTANDING PERSISTENT ATRIAL FIBRILLATION (MULTI): ICD-10-CM

## 2024-07-03 DIAGNOSIS — Z82.49 FAMILY HISTORY OF EARLY CAD: ICD-10-CM

## 2024-07-03 LAB
ATRIAL RATE: 84 BPM
P AXIS: 66 DEGREES
P OFFSET: 207 MS
P ONSET: 153 MS
PR INTERVAL: 140 MS
Q ONSET: 223 MS
QRS COUNT: 14 BEATS
QRS DURATION: 72 MS
QT INTERVAL: 364 MS
QTC CALCULATION(BAZETT): 430 MS
QTC FREDERICIA: 407 MS
R AXIS: 19 DEGREES
T AXIS: 50 DEGREES
T OFFSET: 405 MS
VENTRICULAR RATE: 84 BPM

## 2024-07-03 PROCEDURE — 99214 OFFICE O/P EST MOD 30 MIN: CPT | Performed by: INTERNAL MEDICINE

## 2024-07-03 PROCEDURE — 93280 PM DEVICE PROGR EVAL DUAL: CPT

## 2024-07-03 PROCEDURE — 99204 OFFICE O/P NEW MOD 45 MIN: CPT | Performed by: INTERNAL MEDICINE

## 2024-07-03 PROCEDURE — 93005 ELECTROCARDIOGRAM TRACING: CPT | Performed by: INTERNAL MEDICINE

## 2024-07-03 PROCEDURE — 1036F TOBACCO NON-USER: CPT | Performed by: INTERNAL MEDICINE

## 2024-07-03 RX ORDER — ROSUVASTATIN CALCIUM 20 MG/1
20 TABLET, COATED ORAL DAILY
COMMUNITY

## 2024-07-03 RX ORDER — CLONIDINE HYDROCHLORIDE 0.1 MG/1
0.1 TABLET ORAL 2 TIMES DAILY
COMMUNITY

## 2024-07-03 RX ORDER — PANTOPRAZOLE SODIUM 40 MG/1
40 TABLET, DELAYED RELEASE ORAL
COMMUNITY

## 2024-07-03 RX ORDER — PAROXETINE 30 MG/1
30 TABLET, FILM COATED ORAL EVERY MORNING
COMMUNITY

## 2024-07-03 RX ORDER — PREGABALIN 225 MG/1
225 CAPSULE ORAL 2 TIMES DAILY
COMMUNITY

## 2024-07-03 RX ORDER — HYDROXYZINE HYDROCHLORIDE 25 MG/1
25 TABLET, FILM COATED ORAL EVERY 6 HOURS PRN
COMMUNITY

## 2024-07-03 RX ORDER — ESZOPICLONE 3 MG/1
3 TABLET, FILM COATED ORAL NIGHTLY
COMMUNITY

## 2024-07-03 RX ORDER — LEVOTHYROXINE SODIUM 75 UG/1
75 TABLET ORAL DAILY
COMMUNITY

## 2024-07-03 RX ORDER — DIPHENHYDRAMINE HCL 25 MG
25 TABLET ORAL NIGHTLY PRN
COMMUNITY

## 2024-07-03 ASSESSMENT — ENCOUNTER SYMPTOMS
DEPRESSION: 0
OCCASIONAL FEELINGS OF UNSTEADINESS: 1
LOSS OF SENSATION IN FEET: 0

## 2024-07-03 NOTE — PROGRESS NOTES
Referred by Gerry Gale MD provider found for   Chief Complaint   Patient presents with    Irregular Heart Beat     PPM battery reolacment    cardiac arrythmia        Lois Fisher is a 39 y.o. year old female patient with h/o POTS syndrome with multiple syncope, s/p PPM implant in 2013 in [place], strong family history for CAD. The patient moved to [place] and presents today to get established.      PMHx/PSHx: As above    FamHx: unremarkable     Allergies:  Allergies   Allergen Reactions    Iodinated Contrast Media Anaphylaxis    Adhesive Tape-Silicones Itching    Ibuprofen Itching    Latex Itching    Neosporin (Rbb-[id number]-Hzwpe) [Neomycin-Bacitracnzn-Polymyxnb] Unknown    Penicillin Nausea/vomiting    Quinolones Unknown    Toradol [Ketorolac] Itching    Vancomycin Nausea/vomiting    Sulfa (Sulfonamide Antibiotics) Rash        Review of Systems    Constitutional: not feeling tired.   Eyes: no eyesight problems.   ENT: no hearing loss and no nosebleeds.   Cardiovascular: no intermittent leg claudication and as noted in HPI.   Respiratory: no chronic cough and no shortness of breath.   Gastrointestinal: no change in bowel habits and no blood in stools.   Genitourinary: no urinary frequency and no hematuria.   Skin: no skin rashes.   Neurological: no seizures and no frequent falls.   Psychiatric: no depression and not suicidal.   All other systems have been reviewed and are negative for complaint.     Outpatient Medications:  Current Outpatient Medications   Medication Instructions    brivaracetam (Briviact) 75 mg tablet oral, 2 times daily    cloNIDine (CATAPRES) 0.1 mg, oral, 2 times daily    diphenhydrAMINE (BENADRYL ALLERGY) 25 mg, oral, Nightly PRN    eszopiclone (LUNESTA) 3 mg, oral, Nightly, Take immediately before bedtime    hydrOXYzine HCL (ATARAX) 25 mg, oral, Every 6 hours PRN    levothyroxine (SYNTHROID, LEVOXYL) 75 mcg, oral, Daily, Take on an empty stomach at the same time each day, either 30 to  60 minutes prior to breakfast    pantoprazole (PROTONIX) 40 mg, oral, Daily before breakfast, Do not crush, chew, or split.    PARoxetine (PAXIL) 30 mg, oral, Every morning    pregabalin (LYRICA) 225 mg, oral, 2 times daily    rosuvastatin (CRESTOR) 20 mg, oral, Daily         Last Recorded Vitals:      7/3/2024     8:17 AM   Vitals   Systolic 116   Diastolic 80   Heart Rate 84   Visit Report Report    Visit Vitals  /80   Pulse 84   SpO2 96%   Smoking Status Never        Physical Exam:  Constitutional: alert and in no acute distress.   Eyes: no erythema, swelling or discharge from the eye .   Neck: neck is supple, symmetric, trachea midline, no masses  and no thyromegaly .   Pulmonary: no increased work of breathing or signs of respiratory distress  and lungs clear to auscultation.    Cardiovascular: carotid pulses 2+ bilaterally with no bruit , JVP was normal, no thrills , regular rhythm, normal S1 and S2, no murmurs , pedal pulses 2+ bilaterally  and no edema .   Abdomen: abdomen non-tender, no masses  and no hepatomegaly .   Skin: skin warm and dry, normal skin turgor .   Psychiatric judgment and insight is normal  and oriented to person, place and time .        Assessment/Plan   Problem List Items Addressed This Visit    None  Visit Diagnoses         Codes    Abnormal EKG    -  Primary R94.31    Relevant Orders    ECG 12 lead (Clinic Performed) (Completed)    Longstanding persistent atrial fibrillation (Multi)     I48.11    Relevant Orders    ECG 12 lead (Clinic Performed) (Completed)            Lois Fisher is a 39 y.o. year old female patient with h/o POTS syndrome with multiple syncope, s/p PPM implant in 2013 in [place], strong family history for CAD. The patient moved to [place] and presents today to get established.    Her current ECG shows NSR with narrow QRS and HR of 84 bpm. The patient reports still experiencing syncope being the last one last week. Her device interrogation today showed a  normal device function and 20% remaining battery life.Will get her refer to neurology autonomic clinic. She also reports a strong family history of CAD. Will refer her to also get established with general cardiology.        Gerry Bowser MD  Cardiac Electrophysiology      Thank you very much for allowing me to participate in the care of this pleasant patient. Please do not hesitate to contact me with any further questions or concerns regarding his care.    **Disclaimer: This note was dictated by speech recognition, and every effort has been made to prevent any error in transcription, however minor errors may be present**

## 2024-07-22 ENCOUNTER — APPOINTMENT (OUTPATIENT)
Dept: PRIMARY CARE | Facility: CLINIC | Age: 39
End: 2024-07-22
Payer: COMMERCIAL

## 2024-07-22 VITALS
DIASTOLIC BLOOD PRESSURE: 74 MMHG | HEART RATE: 91 BPM | OXYGEN SATURATION: 98 % | SYSTOLIC BLOOD PRESSURE: 105 MMHG | BODY MASS INDEX: 33.26 KG/M2 | HEIGHT: 65 IN | WEIGHT: 199.6 LBS

## 2024-07-22 DIAGNOSIS — K31.84 GASTROPARESIS: ICD-10-CM

## 2024-07-22 DIAGNOSIS — Z87.11 H/O GASTRIC ULCER: ICD-10-CM

## 2024-07-22 DIAGNOSIS — I10 HYPERTENSION, UNSPECIFIED TYPE: ICD-10-CM

## 2024-07-22 DIAGNOSIS — Z95.0 PACEMAKER: ICD-10-CM

## 2024-07-22 DIAGNOSIS — G47.01 INSOMNIA SECONDARY TO CHRONIC PAIN: ICD-10-CM

## 2024-07-22 DIAGNOSIS — G40.109 FOCAL EPILEPSY (MULTI): ICD-10-CM

## 2024-07-22 DIAGNOSIS — F43.10 PTSD (POST-TRAUMATIC STRESS DISORDER): ICD-10-CM

## 2024-07-22 DIAGNOSIS — G90.A POTS (POSTURAL ORTHOSTATIC TACHYCARDIA SYNDROME): ICD-10-CM

## 2024-07-22 DIAGNOSIS — E78.2 MIXED HYPERLIPIDEMIA: ICD-10-CM

## 2024-07-22 DIAGNOSIS — F51.04 CHRONIC INSOMNIA: ICD-10-CM

## 2024-07-22 DIAGNOSIS — K22.4 ESOPHAGEAL SPASM: ICD-10-CM

## 2024-07-22 DIAGNOSIS — F41.9 ANXIETY: ICD-10-CM

## 2024-07-22 DIAGNOSIS — D89.40 MAST CELL ACTIVATION SYNDROME (MULTI): ICD-10-CM

## 2024-07-22 DIAGNOSIS — G90.1 DYSAUTONOMIA (MULTI): ICD-10-CM

## 2024-07-22 DIAGNOSIS — H66.003 ACUTE SUPPR OTITIS MEDIA W/O SPON RUPT EAR DRUM, BILATERAL: ICD-10-CM

## 2024-07-22 DIAGNOSIS — Z79.899 MEDICATION MANAGEMENT: ICD-10-CM

## 2024-07-22 DIAGNOSIS — E03.9 ACQUIRED HYPOTHYROIDISM: ICD-10-CM

## 2024-07-22 DIAGNOSIS — G89.29 INSOMNIA SECONDARY TO CHRONIC PAIN: ICD-10-CM

## 2024-07-22 DIAGNOSIS — E16.2 HYPOGLYCEMIA: ICD-10-CM

## 2024-07-22 DIAGNOSIS — Z76.89 ENCOUNTER TO ESTABLISH CARE: Primary | ICD-10-CM

## 2024-07-22 DIAGNOSIS — R53.83 OTHER FATIGUE: ICD-10-CM

## 2024-07-22 DIAGNOSIS — Q79.60 EHLERS-DANLOS SYNDROME (HHS-HCC): ICD-10-CM

## 2024-07-22 PROCEDURE — 99205 OFFICE O/P NEW HI 60 MIN: CPT | Performed by: STUDENT IN AN ORGANIZED HEALTH CARE EDUCATION/TRAINING PROGRAM

## 2024-07-22 PROCEDURE — 3078F DIAST BP <80 MM HG: CPT | Performed by: STUDENT IN AN ORGANIZED HEALTH CARE EDUCATION/TRAINING PROGRAM

## 2024-07-22 PROCEDURE — 1036F TOBACCO NON-USER: CPT | Performed by: STUDENT IN AN ORGANIZED HEALTH CARE EDUCATION/TRAINING PROGRAM

## 2024-07-22 PROCEDURE — 3008F BODY MASS INDEX DOCD: CPT | Performed by: STUDENT IN AN ORGANIZED HEALTH CARE EDUCATION/TRAINING PROGRAM

## 2024-07-22 PROCEDURE — 3074F SYST BP LT 130 MM HG: CPT | Performed by: STUDENT IN AN ORGANIZED HEALTH CARE EDUCATION/TRAINING PROGRAM

## 2024-07-22 RX ORDER — METHOCARBAMOL 750 MG/1
750 TABLET, FILM COATED ORAL 4 TIMES DAILY
COMMUNITY

## 2024-07-22 RX ORDER — ROSUVASTATIN CALCIUM 20 MG/1
20 TABLET, COATED ORAL DAILY
Qty: 90 TABLET | Refills: 3 | Status: SHIPPED | OUTPATIENT
Start: 2024-07-22

## 2024-07-22 RX ORDER — PANTOPRAZOLE SODIUM 40 MG/1
40 TABLET, DELAYED RELEASE ORAL
Qty: 90 TABLET | Refills: 3 | Status: SHIPPED | OUTPATIENT
Start: 2024-07-22

## 2024-07-22 RX ORDER — ONDANSETRON 8 MG/1
8 TABLET, ORALLY DISINTEGRATING ORAL EVERY 8 HOURS PRN
Qty: 30 TABLET | Refills: 3 | Status: SHIPPED | OUTPATIENT
Start: 2024-07-22

## 2024-07-22 RX ORDER — MELATONIN 10 MG
CAPSULE ORAL
COMMUNITY

## 2024-07-22 RX ORDER — CETIRIZINE HYDROCHLORIDE 10 MG/1
TABLET, CHEWABLE ORAL 2 TIMES DAILY
COMMUNITY

## 2024-07-22 RX ORDER — CLONIDINE HYDROCHLORIDE 0.1 MG/1
0.1 TABLET ORAL 2 TIMES DAILY
Qty: 90 TABLET | Refills: 3 | Status: SHIPPED | OUTPATIENT
Start: 2024-07-22

## 2024-07-22 RX ORDER — LEVOTHYROXINE SODIUM 75 UG/1
75 TABLET ORAL DAILY
Qty: 90 TABLET | Refills: 3 | Status: SHIPPED | OUTPATIENT
Start: 2024-07-22

## 2024-07-22 RX ORDER — ONDANSETRON 8 MG/1
8 TABLET, ORALLY DISINTEGRATING ORAL EVERY 8 HOURS PRN
COMMUNITY
End: 2024-07-22 | Stop reason: SDUPTHER

## 2024-07-22 RX ORDER — CETIRIZINE HYDROCHLORIDE 10 MG/1
10 TABLET ORAL 2 TIMES DAILY
Qty: 180 TABLET | Refills: 3 | Status: SHIPPED | OUTPATIENT
Start: 2024-07-22 | End: 2024-10-20

## 2024-07-22 RX ORDER — PAROXETINE 30 MG/1
30 TABLET, FILM COATED ORAL EVERY MORNING
Qty: 90 TABLET | Refills: 3 | Status: SHIPPED | OUTPATIENT
Start: 2024-07-22

## 2024-07-22 RX ORDER — ESZOPICLONE 3 MG/1
3 TABLET, FILM COATED ORAL NIGHTLY
Qty: 30 TABLET | Refills: 2 | Status: SHIPPED | OUTPATIENT
Start: 2024-07-22

## 2024-07-22 ASSESSMENT — ENCOUNTER SYMPTOMS
LOSS OF SENSATION IN FEET: 0
OCCASIONAL FEELINGS OF UNSTEADINESS: 1
DEPRESSION: 1
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
## 2024-08-06 ENCOUNTER — OFFICE VISIT (OUTPATIENT)
Dept: PAIN MEDICINE | Facility: HOSPITAL | Age: 39
End: 2024-08-06
Payer: COMMERCIAL

## 2024-08-06 ENCOUNTER — LAB (OUTPATIENT)
Dept: LAB | Facility: LAB | Age: 39
End: 2024-08-06
Payer: COMMERCIAL

## 2024-08-06 DIAGNOSIS — R53.83 OTHER FATIGUE: ICD-10-CM

## 2024-08-06 DIAGNOSIS — Z79.899 MEDICATION MANAGEMENT: ICD-10-CM

## 2024-08-06 DIAGNOSIS — Q79.60 EHLERS-DANLOS SYNDROME (HHS-HCC): Primary | ICD-10-CM

## 2024-08-06 DIAGNOSIS — E78.2 MIXED HYPERLIPIDEMIA: ICD-10-CM

## 2024-08-06 DIAGNOSIS — M43.06 LUMBAR SPONDYLOLYSIS: ICD-10-CM

## 2024-08-06 DIAGNOSIS — E16.2 HYPOGLYCEMIA: ICD-10-CM

## 2024-08-06 DIAGNOSIS — G40.109 FOCAL EPILEPSY (MULTI): ICD-10-CM

## 2024-08-06 LAB
25(OH)D3 SERPL-MCNC: 22 NG/ML (ref 30–100)
ALBUMIN SERPL BCP-MCNC: 4.2 G/DL (ref 3.4–5)
ALP SERPL-CCNC: 132 U/L (ref 33–110)
ALT SERPL W P-5'-P-CCNC: 23 U/L (ref 7–45)
ANION GAP SERPL CALC-SCNC: 12 MMOL/L (ref 10–20)
AST SERPL W P-5'-P-CCNC: 23 U/L (ref 9–39)
BILIRUB SERPL-MCNC: 0.4 MG/DL (ref 0–1.2)
BUN SERPL-MCNC: 12 MG/DL (ref 6–23)
CALCIUM SERPL-MCNC: 9 MG/DL (ref 8.6–10.3)
CHLORIDE SERPL-SCNC: 104 MMOL/L (ref 98–107)
CHOLEST SERPL-MCNC: 163 MG/DL (ref 0–199)
CHOLESTEROL/HDL RATIO: 3.1
CO2 SERPL-SCNC: 28 MMOL/L (ref 21–32)
CREAT SERPL-MCNC: 0.83 MG/DL (ref 0.5–1.05)
EGFRCR SERPLBLD CKD-EPI 2021: >90 ML/MIN/1.73M*2
ERYTHROCYTE [DISTWIDTH] IN BLOOD BY AUTOMATED COUNT: 13 % (ref 11.5–14.5)
EST. AVERAGE GLUCOSE BLD GHB EST-MCNC: 105 MG/DL
FOLATE SERPL-MCNC: 16.7 NG/ML
GLUCOSE SERPL-MCNC: 89 MG/DL (ref 74–99)
HBA1C MFR BLD: 5.3 %
HCT VFR BLD AUTO: 42.3 % (ref 36–46)
HDLC SERPL-MCNC: 52.7 MG/DL
HGB BLD-MCNC: 13.9 G/DL (ref 12–16)
LDLC SERPL CALC-MCNC: 92 MG/DL
MCH RBC QN AUTO: 28.8 PG (ref 26–34)
MCHC RBC AUTO-ENTMCNC: 32.9 G/DL (ref 32–36)
MCV RBC AUTO: 88 FL (ref 80–100)
NON HDL CHOLESTEROL: 110 MG/DL (ref 0–149)
NRBC BLD-RTO: 0 /100 WBCS (ref 0–0)
PLATELET # BLD AUTO: 219 X10*3/UL (ref 150–450)
POTASSIUM SERPL-SCNC: 4.3 MMOL/L (ref 3.5–5.3)
PROT SERPL-MCNC: 6.5 G/DL (ref 6.4–8.2)
RBC # BLD AUTO: 4.83 X10*6/UL (ref 4–5.2)
SODIUM SERPL-SCNC: 140 MMOL/L (ref 136–145)
TRIGL SERPL-MCNC: 94 MG/DL (ref 0–149)
TSH SERPL-ACNC: 1.14 MIU/L (ref 0.44–3.98)
VIT B12 SERPL-MCNC: 644 PG/ML (ref 211–911)
VLDL: 19 MG/DL (ref 0–40)
WBC # BLD AUTO: 5.9 X10*3/UL (ref 4.4–11.3)

## 2024-08-06 PROCEDURE — 99204 OFFICE O/P NEW MOD 45 MIN: CPT | Performed by: ANESTHESIOLOGY

## 2024-08-06 PROCEDURE — 36415 COLL VENOUS BLD VENIPUNCTURE: CPT

## 2024-08-06 PROCEDURE — 80373 DRUG SCREENING TRAMADOL: CPT

## 2024-08-06 PROCEDURE — 80053 COMPREHEN METABOLIC PANEL: CPT

## 2024-08-06 PROCEDURE — 80361 OPIATES 1 OR MORE: CPT

## 2024-08-06 PROCEDURE — 83036 HEMOGLOBIN GLYCOSYLATED A1C: CPT

## 2024-08-06 PROCEDURE — 80307 DRUG TEST PRSMV CHEM ANLYZR: CPT

## 2024-08-06 PROCEDURE — 80368 SEDATIVE HYPNOTICS: CPT

## 2024-08-06 PROCEDURE — 82306 VITAMIN D 25 HYDROXY: CPT

## 2024-08-06 PROCEDURE — 80354 DRUG SCREENING FENTANYL: CPT

## 2024-08-06 PROCEDURE — 84443 ASSAY THYROID STIM HORMONE: CPT

## 2024-08-06 PROCEDURE — 82570 ASSAY OF URINE CREATININE: CPT

## 2024-08-06 PROCEDURE — 82607 VITAMIN B-12: CPT

## 2024-08-06 PROCEDURE — 80349 CANNABINOIDS NATURAL: CPT

## 2024-08-06 PROCEDURE — 80365 DRUG SCREENING OXYCODONE: CPT

## 2024-08-06 PROCEDURE — 85027 COMPLETE CBC AUTOMATED: CPT

## 2024-08-06 PROCEDURE — 80061 LIPID PANEL: CPT

## 2024-08-06 PROCEDURE — 82746 ASSAY OF FOLIC ACID SERUM: CPT

## 2024-08-06 PROCEDURE — 80358 DRUG SCREENING METHADONE: CPT

## 2024-08-06 PROCEDURE — 80346 BENZODIAZEPINES1-12: CPT

## 2024-08-06 ASSESSMENT — PAIN SCALES - GENERAL: PAINLEVEL: 7

## 2024-08-06 NOTE — PATIENT INSTRUCTIONS
Weaning Nucynta  -For the first 3 days, take nucynta up to three times a day  -For the next 3 days take nucynta up to twice a day  -Then take nucynta once a day as needed until establishing care with a new provider who can prescribe nucynta with concurrent medical marijuana

## 2024-08-06 NOTE — PROGRESS NOTES
"Subjective   Patient ID: Lois Fisher is a 39 y.o. female with a past medical history of Emmanuel-Danlos     HPI:   Lois is a new patient coming for chronic pain and long-standing Emmanuel-Danlos syndrome. She has been in pain since childhood. Her pain can be all over and comes and goes, generally with subluxation of joints. Her worst pain is in her low back and feels like a pinched nerve. It is hard to pinpoint the distribution of the pain and it only sometimes radiates down the thigh. It can be as bad a 10/10. She was previously seeing Dr. Ellison in [place], [place]. She has previously performed lumbar facet RFAs with good relief. She was also prescribed medical marijuana, Nucynta, and Lyrica with some relief.  The patient notes that all of the aforementioned medications were prescribed by her pain physician.  The patient notes that her prior pain physician was somewhat known to be a \" pill pusher\" but that is who she was allowed to see according to her insurance/in-network provider.  She notes that she did better with long-acting Nucynta but that was not previously covered by insurance.  Her spouse notes that long-acting Nucynta is now covered.    Of note, she has a history of POTS with frequent falls and history of concussions. She uses a wheelchair to prevent falls and it helps with the back pain. She has a pacemaker and cannot have and MRI. She also has an allergy to contrast.     She reports that she cannot have steroids because of her Emmanuel-Danlos.    She also has a history of epilepsy. The medical marijuana helps with the seizures.     She uses a wheelchair and for long distances may use a motorized scooter which was given/prescribed by her prior pain physician.  The patient says she uses a motorized scooter to go long distances but can walk within her home.    The patient reports wanting to be off of opioid medication and off of controlled substances but has concerns about what would happen if she dislocates " something, needs acute pain medication.  She gave examples of dislocating her finger or other joints which would require going to the ER if she did not have oral medication at home for use daily.    Physical Therapy: The patient has done six or more weeks of physical therapy in the past six months with some improvement    Other Conservative Measures she has tried: Heating Pad, Ice, and Injections  Classes of medications tried in the past: Acetaminophen, Gabapentenoids, Muscle Relaxants, Medical Marijuana, and Opioids      Review of Systems   13-point ROS done and negative except for HPI.     Current Outpatient Medications   Medication Instructions    brivaracetam (Briviact) 75 mg tablet oral, 2 times daily    cetirizine (ZyrTEC) 10 mg chewable tablet oral, 2 times daily    cetirizine (ZYRTEC) 10 mg, oral, 2 times daily    cloNIDine (CATAPRES) 0.1 mg, oral, 2 times daily    diphenhydrAMINE (BENADRYL ALLERGY) 25 mg, oral, Nightly PRN    eszopiclone (LUNESTA) 3 mg, oral, Nightly, Take immediately before bedtime    hydrOXYzine HCL (ATARAX) 25 mg, oral, Every 6 hours PRN    levothyroxine (SYNTHROID, LEVOXYL) 75 mcg, oral, Daily, Take on an empty stomach at the same time each day, either 30 to 60 minutes prior to breakfast    melatonin 10 mg capsule oral    methocarbamol (ROBAXIN) 750 mg, oral, 4 times daily    ondansetron ODT (ZOFRAN-ODT) 8 mg, oral, Every 8 hours PRN    pantoprazole (PROTONIX) 40 mg, oral, Daily before breakfast, Do not crush, chew, or split.    PARoxetine (PAXIL) 30 mg, oral, Every morning    pregabalin (LYRICA) 225 mg, oral, 2 times daily    rosuvastatin (CRESTOR) 20 mg, oral, Daily    tapentadol (NUCYNTA) 50 mg, oral, 3 times daily    theophylline ER (VANDANA-24) 400 mg, oral, Daily, Do not crush or chew.       Past Medical History:   Diagnosis Date    Anxiety and depression     Dysautonomia (Multi)     Emmanuel-Danlos disease (HHS-HCC)     Focal epilepsy (Multi)     Gastric ulcer, chronic      Gastroparesis     Hyperlipemia     Hypothyroidism     Idiopathic postprandial hypoglycemia     Post concussion syndrome     Prinzmetal angina (CMS-HCC)         Past Surgical History:   Procedure Laterality Date    APPENDECTOMY      BILE DUCT STENT PLACEMENT      BILIARY DRAINAGE CATHETER PLACEMENT W/ BILE DUCT TUBE CHANGE       SECTION, LOW TRANSVERSE      CHOLECYSTECTOMY      PACEMAKER PLACEMENT          Family History   Adopted: Yes   Problem Relation Name Age of Onset    Hyperlipidemia Mother      Lead poisoning Father      Colon cancer Father      Heart attack Father      Drug abuse Sister      Heart attack Brother      Drug abuse Brother      Autism Son      Asthma Son      Allergies Son      Club foot Son      Other (Heart bypass) Mother's Sister      Heart attack Maternal Grandfather      Heart attack Paternal Grandfather          Allergies   Allergen Reactions    Honey Anaphylaxis    Iodinated Contrast Media Anaphylaxis    Adhesive Tape-Silicones Itching    Ibuprofen Itching    Latex Itching    Neosporin (Tki-Ios-Aigzs) [Neomycin-Bacitracnzn-Polymyxnb] Unknown    Penicillin Nausea/vomiting    Quinolones Unknown    Toradol [Ketorolac] Itching    Tropical Fruit Flavor Unknown    Vancomycin Nausea/vomiting    Sulfa (Sulfonamide Antibiotics) Rash        Objective     There were no vitals filed for this visit.     Physical Exam  General: NAD, well groomed, well nourished  Eyes: Non-icteric sclera, EOMI  Ears, Nose, Mouth, and Throat: External ears and nose appear to be without deformity or rash. No lesions or masses noted. Hearing is grossly intact.   Neck: Trachea midline  Respiratory: Nonlabored breathing   Cardiovascular: no peripheral edema   Skin: No rashes or open lesions/ulcers identified on skin.    Back:   Palpation: No tenderness to palpation over lumbar paraspinous muscles.   Straight leg raise: does not reproduce their pain, bilaterally   CRISTOPHER Maneuver does not reproduce pain  Facet  tenderness bilateral  Facet loading (+) bilateral    Neurologic:   Cranial nerves grossly intact.   Strength 5/5 and symmetric plantar/dorsiflexion   Sensation: Normal to light touch throughout, pinprick, intact throughout.      Psychiatric: Alert, orientation to person, place, and time. Cooperative.    Imaging personally reviewed and independently interpreted:   none    Assessment/Plan   Lois is a 40yo with chronic back pain and history of Emmanuel-Danlos treated with medical marijuana and opioids. We informed Lois and her spouse that we generally are not prescribing opioids for non-cancer pain, as well as not prescribing opioids with concurrent marijuana as are within the guidelines at .  No physicians within the  system are able to recommend or prescribe marijuana/give a medical marijuana card.  We could offer targeted procedures for her back pain, possibly targeted facets as they have worked in the past. We also offered pain psychology, physical therapy, and pain rehab in [place]. However, Lois and her  did not think she could manage her pain without either opioids and/or medical marijuana.  Marijuana is helping seizures and pain.  Since she would like to keep all of her care in one place, they will try to find a provider outside of .  We discussed that many chronic pain groups across the country may have different care paths and in [place] many may require discontinuation of marijuana based/THC substances if taking concurrent opioid and controlled substances like Nucynta and Lyrica/pregabalin.  I did offer to reach out to palliative medicine as well but was declined.      The patient was invited to contact us back anytime with any questions or concerns and follow-up with us in the office as needed.     Diagnoses and all orders for this visit:  Emmanuel-Danlos syndrome ([facility])  Lumbar spondylolysis    Ashu Francis DO  Pain Fellow

## 2024-08-08 LAB
AMPHETAMINES UR QL SCN: ABNORMAL
BARBITURATES UR QL SCN: ABNORMAL
BZE UR QL SCN: ABNORMAL
CANNABINOIDS UR QL SCN: ABNORMAL
CREAT UR-MCNC: 99.4 MG/DL (ref 20–320)
PCP UR QL SCN: ABNORMAL

## 2024-08-11 LAB
1OH-MIDAZOLAM UR CFM-MCNC: <25 NG/ML
6MAM UR CFM-MCNC: <25 NG/ML
7AMINOCLONAZEPAM UR CFM-MCNC: <25 NG/ML
A-OH ALPRAZ UR CFM-MCNC: <25 NG/ML
ALPRAZ UR CFM-MCNC: <25 NG/ML
CARBOXYTHC UR-MCNC: 193 NG/ML
CHLORDIAZEP UR CFM-MCNC: <25 NG/ML
CLONAZEPAM UR CFM-MCNC: <25 NG/ML
CODEINE UR CFM-MCNC: <50 NG/ML
DIAZEPAM UR CFM-MCNC: <25 NG/ML
EDDP UR CFM-MCNC: <25 NG/ML
FENTANYL UR CFM-MCNC: <2.5 NG/ML
HYDROCODONE CTO UR CFM-MCNC: <25 NG/ML
HYDROMORPHONE UR CFM-MCNC: <25 NG/ML
LORAZEPAM UR CFM-MCNC: <25 NG/ML
METHADONE UR CFM-MCNC: <25 NG/ML
MIDAZOLAM UR CFM-MCNC: <25 NG/ML
MORPHINE UR CFM-MCNC: <50 NG/ML
NORDIAZEPAM UR CFM-MCNC: <25 NG/ML
NORFENTANYL UR CFM-MCNC: <2.5 NG/ML
NORHYDROCODONE UR CFM-MCNC: <25 NG/ML
NOROXYCODONE UR CFM-MCNC: <25 NG/ML
NORTRAMADOL UR-MCNC: <50 NG/ML
OXAZEPAM UR CFM-MCNC: <25 NG/ML
OXYCODONE UR CFM-MCNC: <25 NG/ML
OXYMORPHONE UR CFM-MCNC: <25 NG/ML
TEMAZEPAM UR CFM-MCNC: <25 NG/ML
TRAMADOL UR CFM-MCNC: <50 NG/ML
ZOLPIDEM UR CFM-MCNC: <25 NG/ML
ZOLPIDEM UR-MCNC: <25 NG/ML

## 2024-08-20 DIAGNOSIS — H66.003 ACUTE SUPPR OTITIS MEDIA W/O SPON RUPT EAR DRUM, BILATERAL: Primary | ICD-10-CM

## 2024-08-20 DIAGNOSIS — G40.109 FOCAL EPILEPSY (MULTI): ICD-10-CM

## 2024-08-20 NOTE — PROGRESS NOTES
Pt sent a message for Briviact 100mg BID refill   Moved from [place] to [facility] , yet to [facility] with neuro   Referral placed at the last visit   Will rx for 90 days given the delay in scheduling neuro appts

## 2024-08-27 ENCOUNTER — TELEMEDICINE (OUTPATIENT)
Dept: PRIMARY CARE | Facility: CLINIC | Age: 39
End: 2024-08-27
Payer: COMMERCIAL

## 2024-08-27 DIAGNOSIS — J20.9 ACUTE BRONCHITIS, UNSPECIFIED ORGANISM: Primary | ICD-10-CM

## 2024-08-27 PROCEDURE — 99214 OFFICE O/P EST MOD 30 MIN: CPT | Performed by: STUDENT IN AN ORGANIZED HEALTH CARE EDUCATION/TRAINING PROGRAM

## 2024-08-27 PROCEDURE — 1036F TOBACCO NON-USER: CPT | Performed by: STUDENT IN AN ORGANIZED HEALTH CARE EDUCATION/TRAINING PROGRAM

## 2024-08-27 RX ORDER — DOXYCYCLINE 100 MG/1
100 CAPSULE ORAL 2 TIMES DAILY
Qty: 20 CAPSULE | Refills: 0 | Status: SHIPPED | OUTPATIENT
Start: 2024-08-27 | End: 2024-09-06

## 2024-08-27 ASSESSMENT — ENCOUNTER SYMPTOMS
RHINORRHEA: 1
MYALGIAS: 1
HEADACHES: 1
WHEEZING: 1
HEARTBURN: 0
SORE THROAT: 1
COUGH: 1
SWEATS: 0
HEMOPTYSIS: 0
SHORTNESS OF BREATH: 1
WEIGHT LOSS: 0
FEVER: 1
CHILLS: 1

## 2024-08-27 NOTE — PROGRESS NOTES
Subjective   Patient ID: Lois Fisher is a 39 y.o. female who presents for Follow-up (Discuss a new RX ax. ).        Cough  This is a new problem. The current episode started in the past 7 days. The problem has been gradually worsening. The problem occurs every few minutes. The cough is Productive of sputum and productive of purulent sputum. Associated symptoms include chills, ear congestion, a fever, headaches, myalgias, nasal congestion, postnasal drip, rhinorrhea, a sore throat, shortness of breath and wheezing. Pertinent negatives include no chest pain, ear pain, heartburn, hemoptysis, rash, sweats or weight loss. Risk factors for lung disease include travel.       Has been seen at an  and was given cefidinir to which she noticed a reaction -  prurittus , took 3 doses so far   Has productive cough and wonders abt cough suppressants            Visit Vitals  Smoking Status Never      No LMP recorded.   Current Outpatient Medications   Medication Instructions    brivaracetam (BRIVIACT) 100 mg, oral, 2 times daily    cetirizine (ZyrTEC) 10 mg chewable tablet oral, 2 times daily    cetirizine (ZYRTEC) 10 mg, oral, 2 times daily    cloNIDine (CATAPRES) 0.1 mg, oral, 2 times daily    diphenhydrAMINE (BENADRYL ALLERGY) 25 mg, oral, Nightly PRN    eszopiclone (LUNESTA) 3 mg, oral, Nightly, Take immediately before bedtime    hydrOXYzine HCL (ATARAX) 25 mg, oral, Every 6 hours PRN    levothyroxine (SYNTHROID, LEVOXYL) 75 mcg, oral, Daily, Take on an empty stomach at the same time each day, either 30 to 60 minutes prior to breakfast    melatonin 10 mg capsule oral    methocarbamol (ROBAXIN) 750 mg, oral, 4 times daily    ondansetron ODT (ZOFRAN-ODT) 8 mg, oral, Every 8 hours PRN    pantoprazole (PROTONIX) 40 mg, oral, Daily before breakfast, Do not crush, chew, or split.    PARoxetine (PAXIL) 30 mg, oral, Every morning    pregabalin (LYRICA) 225 mg, oral, 2 times daily    rosuvastatin (CRESTOR) 20 mg, oral, Daily     tapentadol (NUCYNTA) 50 mg, oral, 3 times daily    theophylline ER (VANDANA-24) 400 mg, oral, Daily, Do not crush or chew.      Social History     Tobacco Use    Smoking status: Never    Smokeless tobacco: Never   Substance Use Topics    Alcohol use: Yes     Comment: Occasionally        Review of Systems   Constitutional:  Positive for chills and fever. Negative for weight loss.   HENT:  Positive for postnasal drip, rhinorrhea and sore throat. Negative for ear pain.    Respiratory:  Positive for cough, shortness of breath and wheezing. Negative for hemoptysis.    Cardiovascular:  Negative for chest pain.   Gastrointestinal:  Negative for heartburn.   Musculoskeletal:  Positive for myalgias.   Skin:  Negative for rash.   Neurological:  Positive for headaches.       .      Physical Exam    Limited physical due to the virtual nature of this visit ( real time audio- visual )  Constitutional : Alert, in no distress   .  Pulm : Normal work of breathing   CNS : Moves all extremities symmetrically   Psych:  A , O X 3 normal mood and effect, speaks coherently ct      Assessment/Plan   Diagnoses and all orders for this visit:  Acute bronchitis, unspecified organism  -     doxycycline (Vibramycin) 100 mg capsule; Take 1 capsule (100 mg) by mouth 2 times a day for 10 days. Take with at least 8 ounces (large glass) of water, do not lie down for 30 minutes after    Possible allergic reaction to cefdinir , change to doxy   Cough suppressants are not recommended in productive cough                 Conditions addressed and mgmt as noted above.  Pertinent labs, images/ imaging reports , chart review was done .   Age appropriate labs / labs for mgmt of chronic medical conditions ordered, further mgmt pending the results.       This note is intended for the physician writing it, as well as to communicate findings to other healthcare professionals. These notes use medical lexicon that may be misunderstood by non medical persons. Therefore,  interpretations of medical notes and terminology should be approached with caution.

## 2024-08-31 ENCOUNTER — HOSPITAL ENCOUNTER (OUTPATIENT)
Facility: HOSPITAL | Age: 39
Setting detail: OBSERVATION
Discharge: HOME | End: 2024-09-01
Attending: EMERGENCY MEDICINE | Admitting: NURSE PRACTITIONER
Payer: COMMERCIAL

## 2024-08-31 ENCOUNTER — APPOINTMENT (OUTPATIENT)
Dept: RADIOLOGY | Facility: HOSPITAL | Age: 39
End: 2024-08-31
Payer: COMMERCIAL

## 2024-08-31 ENCOUNTER — APPOINTMENT (OUTPATIENT)
Dept: CARDIOLOGY | Facility: HOSPITAL | Age: 39
End: 2024-08-31
Payer: COMMERCIAL

## 2024-08-31 DIAGNOSIS — R55 SYNCOPE AND COLLAPSE: ICD-10-CM

## 2024-08-31 DIAGNOSIS — R00.0 TACHYCARDIA: Primary | ICD-10-CM

## 2024-08-31 DIAGNOSIS — R07.9 CHEST PAIN, UNSPECIFIED TYPE: ICD-10-CM

## 2024-08-31 PROBLEM — R68.89 FLU-LIKE SYMPTOMS: Status: ACTIVE | Noted: 2024-08-31

## 2024-08-31 LAB
ALBUMIN SERPL BCP-MCNC: 4.7 G/DL (ref 3.4–5)
ALP SERPL-CCNC: 181 U/L (ref 33–110)
ALT SERPL W P-5'-P-CCNC: 22 U/L (ref 7–45)
ANION GAP SERPL CALC-SCNC: 14 MMOL/L (ref 10–20)
APPEARANCE UR: CLEAR
AST SERPL W P-5'-P-CCNC: 21 U/L (ref 9–39)
B-HCG SERPL-ACNC: <2 MIU/ML
BACTERIA #/AREA URNS AUTO: ABNORMAL /HPF
BASOPHILS # BLD AUTO: 0.1 X10*3/UL (ref 0–0.1)
BASOPHILS NFR BLD AUTO: 1.2 %
BILIRUB SERPL-MCNC: 0.5 MG/DL (ref 0–1.2)
BILIRUB UR STRIP.AUTO-MCNC: NEGATIVE MG/DL
BNP SERPL-MCNC: 7 PG/ML (ref 0–99)
BUN SERPL-MCNC: 12 MG/DL (ref 6–23)
CALCIUM SERPL-MCNC: 9.5 MG/DL (ref 8.6–10.3)
CARDIAC TROPONIN I PNL SERPL HS: 3 NG/L (ref 0–13)
CARDIAC TROPONIN I PNL SERPL HS: <3 NG/L (ref 0–13)
CHLORIDE SERPL-SCNC: 102 MMOL/L (ref 98–107)
CK SERPL-CCNC: 83 U/L (ref 0–215)
CO2 SERPL-SCNC: 27 MMOL/L (ref 21–32)
COLOR UR: YELLOW
CREAT SERPL-MCNC: 0.79 MG/DL (ref 0.5–1.05)
EGFRCR SERPLBLD CKD-EPI 2021: >90 ML/MIN/1.73M*2
EOSINOPHIL # BLD AUTO: 0.39 X10*3/UL (ref 0–0.7)
EOSINOPHIL NFR BLD AUTO: 4.6 %
ERYTHROCYTE [DISTWIDTH] IN BLOOD BY AUTOMATED COUNT: 12.5 % (ref 11.5–14.5)
GLUCOSE SERPL-MCNC: 101 MG/DL (ref 74–99)
GLUCOSE UR STRIP.AUTO-MCNC: NORMAL MG/DL
HCT VFR BLD AUTO: 46.4 % (ref 36–46)
HETEROPH AB SERPLBLD QL IA.RAPID: NEGATIVE
HGB BLD-MCNC: 15.7 G/DL (ref 12–16)
IMM GRANULOCYTES # BLD AUTO: 0.02 X10*3/UL (ref 0–0.7)
IMM GRANULOCYTES NFR BLD AUTO: 0.2 % (ref 0–0.9)
KETONES UR STRIP.AUTO-MCNC: NEGATIVE MG/DL
LACTATE SERPL-SCNC: 0.8 MMOL/L (ref 0.4–2)
LEUKOCYTE ESTERASE UR QL STRIP.AUTO: NEGATIVE
LYMPHOCYTES # BLD AUTO: 2.51 X10*3/UL (ref 1.2–4.8)
LYMPHOCYTES NFR BLD AUTO: 29.7 %
MCH RBC QN AUTO: 29 PG (ref 26–34)
MCHC RBC AUTO-ENTMCNC: 33.8 G/DL (ref 32–36)
MCV RBC AUTO: 86 FL (ref 80–100)
MONOCYTES # BLD AUTO: 0.7 X10*3/UL (ref 0.1–1)
MONOCYTES NFR BLD AUTO: 8.3 %
MUCOUS THREADS #/AREA URNS AUTO: ABNORMAL /LPF
NEUTROPHILS # BLD AUTO: 4.74 X10*3/UL (ref 1.2–7.7)
NEUTROPHILS NFR BLD AUTO: 56 %
NITRITE UR QL STRIP.AUTO: NEGATIVE
NRBC BLD-RTO: 0 /100 WBCS (ref 0–0)
PH UR STRIP.AUTO: 5.5 [PH]
PLATELET # BLD AUTO: 266 X10*3/UL (ref 150–450)
POTASSIUM SERPL-SCNC: 4.1 MMOL/L (ref 3.5–5.3)
PROT SERPL-MCNC: 6.9 G/DL (ref 6.4–8.2)
PROT UR STRIP.AUTO-MCNC: ABNORMAL MG/DL
RBC # BLD AUTO: 5.41 X10*6/UL (ref 4–5.2)
RBC # UR STRIP.AUTO: NEGATIVE /UL
RBC #/AREA URNS AUTO: ABNORMAL /HPF
S PYO DNA THROAT QL NAA+PROBE: NOT DETECTED
SARS-COV-2 RNA RESP QL NAA+PROBE: NOT DETECTED
SODIUM SERPL-SCNC: 139 MMOL/L (ref 136–145)
SP GR UR STRIP.AUTO: 1.03
SQUAMOUS #/AREA URNS AUTO: ABNORMAL /HPF
UROBILINOGEN UR STRIP.AUTO-MCNC: NORMAL MG/DL
WBC # BLD AUTO: 8.5 X10*3/UL (ref 4.4–11.3)
WBC #/AREA URNS AUTO: ABNORMAL /HPF

## 2024-08-31 PROCEDURE — 85025 COMPLETE CBC W/AUTO DIFF WBC: CPT

## 2024-08-31 PROCEDURE — 84484 ASSAY OF TROPONIN QUANT: CPT

## 2024-08-31 PROCEDURE — 74176 CT ABD & PELVIS W/O CONTRAST: CPT | Performed by: RADIOLOGY

## 2024-08-31 PROCEDURE — 93005 ELECTROCARDIOGRAM TRACING: CPT

## 2024-08-31 PROCEDURE — 83605 ASSAY OF LACTIC ACID: CPT

## 2024-08-31 PROCEDURE — 3430000001 HC RX 343 DIAGNOSTIC RADIOPHARMACEUTICALS: Performed by: EMERGENCY MEDICINE

## 2024-08-31 PROCEDURE — A9540 TC99M MAA: HCPCS | Performed by: EMERGENCY MEDICINE

## 2024-08-31 PROCEDURE — 82550 ASSAY OF CK (CPK): CPT

## 2024-08-31 PROCEDURE — 76705 ECHO EXAM OF ABDOMEN: CPT

## 2024-08-31 PROCEDURE — 2500000004 HC RX 250 GENERAL PHARMACY W/ HCPCS (ALT 636 FOR OP/ED)

## 2024-08-31 PROCEDURE — 96361 HYDRATE IV INFUSION ADD-ON: CPT

## 2024-08-31 PROCEDURE — 84702 CHORIONIC GONADOTROPIN TEST: CPT

## 2024-08-31 PROCEDURE — 70450 CT HEAD/BRAIN W/O DYE: CPT

## 2024-08-31 PROCEDURE — 36415 COLL VENOUS BLD VENIPUNCTURE: CPT

## 2024-08-31 PROCEDURE — 87040 BLOOD CULTURE FOR BACTERIA: CPT | Mod: AHULAB

## 2024-08-31 PROCEDURE — 87651 STREP A DNA AMP PROBE: CPT

## 2024-08-31 PROCEDURE — 80053 COMPREHEN METABOLIC PANEL: CPT

## 2024-08-31 PROCEDURE — G0378 HOSPITAL OBSERVATION PER HR: HCPCS

## 2024-08-31 PROCEDURE — 96375 TX/PRO/DX INJ NEW DRUG ADDON: CPT | Mod: 59

## 2024-08-31 PROCEDURE — 81001 URINALYSIS AUTO W/SCOPE: CPT

## 2024-08-31 PROCEDURE — 2500000001 HC RX 250 WO HCPCS SELF ADMINISTERED DRUGS (ALT 637 FOR MEDICARE OP)

## 2024-08-31 PROCEDURE — 71046 X-RAY EXAM CHEST 2 VIEWS: CPT | Performed by: RADIOLOGY

## 2024-08-31 PROCEDURE — 71046 X-RAY EXAM CHEST 2 VIEWS: CPT

## 2024-08-31 PROCEDURE — 96365 THER/PROPH/DIAG IV INF INIT: CPT | Mod: 59

## 2024-08-31 PROCEDURE — 80198 ASSAY OF THEOPHYLLINE: CPT | Mod: AHULAB | Performed by: NURSE PRACTITIONER

## 2024-08-31 PROCEDURE — 87635 SARS-COV-2 COVID-19 AMP PRB: CPT

## 2024-08-31 PROCEDURE — 78830 RP LOCLZJ TUM SPECT W/CT 1: CPT

## 2024-08-31 PROCEDURE — 83880 ASSAY OF NATRIURETIC PEPTIDE: CPT

## 2024-08-31 PROCEDURE — 71250 CT THORAX DX C-: CPT | Performed by: RADIOLOGY

## 2024-08-31 PROCEDURE — 2500000004 HC RX 250 GENERAL PHARMACY W/ HCPCS (ALT 636 FOR OP/ED): Performed by: EMERGENCY MEDICINE

## 2024-08-31 PROCEDURE — 99223 1ST HOSP IP/OBS HIGH 75: CPT | Performed by: NURSE PRACTITIONER

## 2024-08-31 PROCEDURE — 99285 EMERGENCY DEPT VISIT HI MDM: CPT | Mod: 25

## 2024-08-31 PROCEDURE — 2500000001 HC RX 250 WO HCPCS SELF ADMINISTERED DRUGS (ALT 637 FOR MEDICARE OP): Performed by: EMERGENCY MEDICINE

## 2024-08-31 PROCEDURE — 70450 CT HEAD/BRAIN W/O DYE: CPT | Performed by: RADIOLOGY

## 2024-08-31 PROCEDURE — 76705 ECHO EXAM OF ABDOMEN: CPT | Performed by: RADIOLOGY

## 2024-08-31 PROCEDURE — 84484 ASSAY OF TROPONIN QUANT: CPT | Mod: 91

## 2024-08-31 PROCEDURE — 96376 TX/PRO/DX INJ SAME DRUG ADON: CPT | Mod: 59

## 2024-08-31 PROCEDURE — 86308 HETEROPHILE ANTIBODY SCREEN: CPT

## 2024-08-31 PROCEDURE — 74176 CT ABD & PELVIS W/O CONTRAST: CPT

## 2024-08-31 RX ORDER — CLONIDINE HYDROCHLORIDE 0.1 MG/1
0.1 TABLET ORAL ONCE
Status: COMPLETED | OUTPATIENT
Start: 2024-08-31 | End: 2024-08-31

## 2024-08-31 RX ORDER — AMOXICILLIN 250 MG
2 CAPSULE ORAL 2 TIMES DAILY PRN
Status: DISCONTINUED | OUTPATIENT
Start: 2024-08-31 | End: 2024-09-01 | Stop reason: HOSPADM

## 2024-08-31 RX ORDER — PANTOPRAZOLE SODIUM 40 MG/10ML
40 INJECTION, POWDER, LYOPHILIZED, FOR SOLUTION INTRAVENOUS
Status: DISCONTINUED | OUTPATIENT
Start: 2024-09-01 | End: 2024-09-01 | Stop reason: SDUPTHER

## 2024-08-31 RX ORDER — PANTOPRAZOLE SODIUM 40 MG/1
40 TABLET, DELAYED RELEASE ORAL
Status: DISCONTINUED | OUTPATIENT
Start: 2024-09-01 | End: 2024-09-01 | Stop reason: SDUPTHER

## 2024-08-31 RX ORDER — PROCHLORPERAZINE MALEATE 5 MG
10 TABLET ORAL EVERY 6 HOURS PRN
Status: DISCONTINUED | OUTPATIENT
Start: 2024-08-31 | End: 2024-09-01 | Stop reason: HOSPADM

## 2024-08-31 RX ORDER — ACETAMINOPHEN 325 MG/1
975 TABLET ORAL ONCE
Status: COMPLETED | OUTPATIENT
Start: 2024-08-31 | End: 2024-08-31

## 2024-08-31 RX ORDER — DIPHENHYDRAMINE HYDROCHLORIDE 50 MG/ML
25 INJECTION INTRAMUSCULAR; INTRAVENOUS ONCE
Status: COMPLETED | OUTPATIENT
Start: 2024-08-31 | End: 2024-08-31

## 2024-08-31 RX ORDER — ACETAMINOPHEN 325 MG/1
650 TABLET ORAL EVERY 6 HOURS PRN
Status: DISCONTINUED | OUTPATIENT
Start: 2024-08-31 | End: 2024-09-01 | Stop reason: HOSPADM

## 2024-08-31 RX ORDER — DIPHENHYDRAMINE HYDROCHLORIDE 50 MG/ML
12.5 INJECTION INTRAMUSCULAR; INTRAVENOUS ONCE
Status: COMPLETED | OUTPATIENT
Start: 2024-08-31 | End: 2024-08-31

## 2024-08-31 RX ORDER — PROCHLORPERAZINE EDISYLATE 5 MG/ML
10 INJECTION INTRAMUSCULAR; INTRAVENOUS EVERY 6 HOURS PRN
Status: DISCONTINUED | OUTPATIENT
Start: 2024-08-31 | End: 2024-09-01 | Stop reason: HOSPADM

## 2024-08-31 RX ORDER — METOCLOPRAMIDE HYDROCHLORIDE 5 MG/ML
10 INJECTION INTRAMUSCULAR; INTRAVENOUS ONCE
Status: COMPLETED | OUTPATIENT
Start: 2024-08-31 | End: 2024-08-31

## 2024-08-31 RX ORDER — TALC
5 POWDER (GRAM) TOPICAL NIGHTLY PRN
Status: DISCONTINUED | OUTPATIENT
Start: 2024-08-31 | End: 2024-09-01

## 2024-08-31 RX ORDER — FAMOTIDINE 20 MG/1
20 TABLET, FILM COATED ORAL DAILY PRN
Status: DISCONTINUED | OUTPATIENT
Start: 2024-08-31 | End: 2024-09-01 | Stop reason: HOSPADM

## 2024-08-31 RX ORDER — PROCHLORPERAZINE EDISYLATE 5 MG/ML
10 INJECTION INTRAMUSCULAR; INTRAVENOUS ONCE
Status: DISCONTINUED | OUTPATIENT
Start: 2024-08-31 | End: 2024-09-01 | Stop reason: HOSPADM

## 2024-08-31 RX ORDER — PREGABALIN 50 MG/1
100 CAPSULE ORAL ONCE
Status: COMPLETED | OUTPATIENT
Start: 2024-08-31 | End: 2024-08-31

## 2024-08-31 RX ORDER — ONDANSETRON HYDROCHLORIDE 2 MG/ML
4 INJECTION, SOLUTION INTRAVENOUS ONCE
Status: COMPLETED | OUTPATIENT
Start: 2024-08-31 | End: 2024-08-31

## 2024-08-31 RX ADMIN — PREGABALIN 100 MG: 50 CAPSULE ORAL at 19:05

## 2024-08-31 RX ADMIN — CLONIDINE HYDROCHLORIDE 0.1 MG: 0.1 TABLET ORAL at 18:16

## 2024-08-31 RX ADMIN — KIT FOR THE PREPARATION OF TECHNETIUM TC 99M ALBUMIN AGGREGATED 4.3 MILLICURIE: 2.5 INJECTION, POWDER, FOR SOLUTION INTRAVENOUS at 19:15

## 2024-08-31 RX ADMIN — DIPHENHYDRAMINE HYDROCHLORIDE 25 MG: 50 INJECTION, SOLUTION INTRAMUSCULAR; INTRAVENOUS at 13:44

## 2024-08-31 RX ADMIN — DIPHENHYDRAMINE HYDROCHLORIDE 12.5 MG: 50 INJECTION, SOLUTION INTRAMUSCULAR; INTRAVENOUS at 18:16

## 2024-08-31 RX ADMIN — PIPERACILLIN SODIUM AND TAZOBACTAM SODIUM 4.5 G: 4; .5 INJECTION, SOLUTION INTRAVENOUS at 13:44

## 2024-08-31 RX ADMIN — SODIUM CHLORIDE 1000 ML: 9 INJECTION, SOLUTION INTRAVENOUS at 12:54

## 2024-08-31 RX ADMIN — ONDANSETRON 4 MG: 2 INJECTION, SOLUTION INTRAMUSCULAR; INTRAVENOUS at 13:24

## 2024-08-31 RX ADMIN — ACETAMINOPHEN 975 MG: 325 TABLET ORAL at 12:54

## 2024-08-31 RX ADMIN — SODIUM CHLORIDE, POTASSIUM CHLORIDE, SODIUM LACTATE AND CALCIUM CHLORIDE 1000 ML: 600; 310; 30; 20 INJECTION, SOLUTION INTRAVENOUS at 17:47

## 2024-08-31 RX ADMIN — METOCLOPRAMIDE 10 MG: 5 INJECTION, SOLUTION INTRAMUSCULAR; INTRAVENOUS at 18:21

## 2024-08-31 ASSESSMENT — PAIN DESCRIPTION - LOCATION
LOCATION: HEAD
LOCATION: HEAD

## 2024-08-31 ASSESSMENT — COLUMBIA-SUICIDE SEVERITY RATING SCALE - C-SSRS
1. IN THE PAST MONTH, HAVE YOU WISHED YOU WERE DEAD OR WISHED YOU COULD GO TO SLEEP AND NOT WAKE UP?: NO
2. HAVE YOU ACTUALLY HAD ANY THOUGHTS OF KILLING YOURSELF?: NO
6. HAVE YOU EVER DONE ANYTHING, STARTED TO DO ANYTHING, OR PREPARED TO DO ANYTHING TO END YOUR LIFE?: NO

## 2024-08-31 ASSESSMENT — PAIN SCALES - GENERAL
PAINLEVEL_OUTOF10: 8
PAINLEVEL_OUTOF10: 5 - MODERATE PAIN
PAINLEVEL_OUTOF10: 9
PAINLEVEL_OUTOF10: 5 - MODERATE PAIN

## 2024-08-31 ASSESSMENT — PAIN - FUNCTIONAL ASSESSMENT: PAIN_FUNCTIONAL_ASSESSMENT: 0-10

## 2024-08-31 ASSESSMENT — PAIN DESCRIPTION - PROGRESSION: CLINICAL_PROGRESSION: NOT CHANGED

## 2024-08-31 NOTE — ED PROVIDER NOTES
HPI   Chief Complaint   Patient presents with    Flu Symptoms       HPI  HISTORY OF PRESENT ILLNESS:  39 y.o. female presenting to the ED with complaint of feeling sick for 2 weeks.  She reports a 2-week history of flulike symptoms and URI symptoms.  She has had 2-weeks of fevers, chills, fatigue, malaise, allergies, cough, sore throat, nasal congestion, decreased appetite, shortness of breath, decreased urination.  She states she has had a productive cough, productive of green to yellow sputum, no hemoptysis.  She has also been intermittently short of breath, worse with exertion.  She has also developed some midsternal chest pain, not specifically worse with exertion, not specifically pleuritic.  She also reports a sore throat and clear rhinorrhea.  Able to tolerate oral intake, no difficulty swallowing food or fluids.  She has had a generalized aching headache, not the worst headache of her life, not thunderclap in onset.  No neck pain or stiffness.  No blurry vision or vision changes.  She also reports having decreased appetite, states that she has barely eating or drinking.  She states that she has had decreased urination as well, decreased urine output, thinks it is because she is dehydrated.  Does also report some burning with urination.  No gross hematuria.  No urgency.  No flank pain.  Has also had nausea and generalized abdominal discomfort, intermittent, not severe.  Feels like she is getting full very quickly.  No vomiting.  Patient states that she was seen at an urgent care and was prescribed cefdinir, however she had a reaction to it, developed pruritus, was seen by her PCP and switched to doxycycline 5 days ago.  Went to urgent care today because her symptoms have not improved, and was referred to the ED for further workup.  She has not taken any other medications for her symptoms today.  Denies chance of pregnancy.  No other complaints or symptoms voiced.    12 point review of systems was performed  and is negative unless otherwise specified in HPI.    PMH: Dysautonomia, Emmanuel-Danlos syndrome, epilepsy, HLD, hypothyroidism, mast cell activation syndrome, gastroparesis.  Surgical history of  section, appendectomy, cholecystectomy, biliary drainage catheter placement with bile duct tube exchange, bile duct stent placement, pacemaker placement  Social history: non smoker, no ETOH, no illicit substances  Patient History   Past Medical History:   Diagnosis Date    Anxiety and depression     Dysautonomia (Multi)     Emmanuel-Danlos disease ([facility]-HCC)     Focal epilepsy (Multi)     Gastric ulcer, chronic     Gastroparesis     Hyperlipemia     Hypothyroidism     Idiopathic postprandial hypoglycemia     Post concussion syndrome     Prinzmetal angina (CMS-HCC)      Past Surgical History:   Procedure Laterality Date    APPENDECTOMY      BILE DUCT STENT PLACEMENT      BILIARY DRAINAGE CATHETER PLACEMENT W/ BILE DUCT TUBE CHANGE       SECTION, LOW TRANSVERSE      CHOLECYSTECTOMY      PACEMAKER PLACEMENT       Family History   Adopted: Yes   Problem Relation Name Age of Onset    Hyperlipidemia Mother      Lead poisoning Father      Colon cancer Father      Heart attack Father      Drug abuse Sister      Heart attack Brother      Drug abuse Brother      Autism Son      Asthma Son      Allergies Son      Club foot Son      Other (Heart bypass) Mother's Sister      Heart attack Maternal Grandfather      Heart attack Paternal Grandfather       Social History     Tobacco Use    Smoking status: Never    Smokeless tobacco: Never   Substance Use Topics    Alcohol use: Yes     Comment: Occasionally    Drug use: Yes     Types: Marijuana       Physical Exam   ED Triage Vitals [24 1209]   Temperature Heart Rate Respirations BP   35.8 °C (96.4 °F) (!) 118 18 (!) 141/92      Pulse Ox Temp Source Heart Rate Source Patient Position   97 % Temporal -- --      BP Location FiO2 (%)     -- --       Physical  Exam  Constitutional:       General: She is not in acute distress.     Appearance: She is not toxic-appearing.   HENT:      Head: Normocephalic and atraumatic.      Nose: Congestion present.      Mouth/Throat:      Mouth: Mucous membranes are moist.      Pharynx: Oropharynx is clear. Posterior oropharyngeal erythema present. No oropharyngeal exudate.   Eyes:      General: No scleral icterus.     Extraocular Movements: Extraocular movements intact.      Pupils: Pupils are equal, round, and reactive to light.   Cardiovascular:      Rate and Rhythm: Regular rhythm. Tachycardia present.      Pulses: Normal pulses.   Pulmonary:      Effort: Pulmonary effort is normal. No respiratory distress.      Breath sounds: No wheezing.      Comments: Scattered rhonchi.  Abdominal:      General: There is no distension.      Palpations: Abdomen is soft.      Tenderness: There is no abdominal tenderness. There is no guarding.   Musculoskeletal:         General: No tenderness. Normal range of motion.      Cervical back: Normal range of motion and neck supple. No rigidity.      Right lower leg: No edema.      Left lower leg: No edema.   Skin:     General: Skin is warm and dry.      Capillary Refill: Capillary refill takes less than 2 seconds.   Neurological:      General: No focal deficit present.      Mental Status: She is alert and oriented to person, place, and time.      Cranial Nerves: No cranial nerve deficit.      Sensory: No sensory deficit.      Motor: No weakness.      Coordination: Coordination normal.      Gait: Gait normal.   Psychiatric:         Mood and Affect: Mood normal.         Behavior: Behavior normal.         Judgment: Judgment normal.       ED Course & MDM   ED Course as of 08/31/24 1451   Sat Aug 31, 2024   1323 Rapid response called from xray, while getting chest xray, patient had a syncopal episode. Hit her right knee and left shoulder, no head strike. Minimal pain right knee. No new symptoms since the event.  No longer dizzy or lightheaded. History of frequent syncopal episodes, states this happens to her all the time. [EH]      ED Course User Index  [EH] Chata Johnson PA-C     Medical Decision Making  ED course / MDM     Summary:  Patient presented with symptoms ongoing for the past 2 weeks.  Complains of URI symptoms including cough, sore throat, fevers and chills, malaise and myalgias, has also developed chest pain, shortness of breath, abdominal discomfort, dysuria, decreased urination, headaches, and nausea.  Tachycardic in triage at 118, vital signs otherwise stable.  Patient nontoxic-appearing, ambulates unassisted, no acute distress.  Lungs good auscultation, heart regular rhythm.  No peripheral edema.  No meningeal signs or nuchal rigidity.  Abdomen soft and nontender.  No CVA tenderness.  No motor or sensory deficits of the upper or lower extremities.  Sepsis alert was called by triage nursing, blood cultures and sepsis orders were sent.  IV established, labs drawn.  Labs overall reassuring, no leukocytosis, normal hemoglobin, minor electrolyte abnormalities, normal kidney liver function.  Normal lactate and BNP, negative hCG.  UA noninfected.  Negative COVID, group A strep, and monoscreen.  EKG nonischemic, and troponin is negative.  Patient did have a syncopal episode while in x-ray, reportedly not unusual for her due to her history of POTS, had no new or worsening symptoms after the syncopal episode, no significant injury sustained.  Chest x-ray shows no acute process.  CT head unremarkable.  CT scan chest abdomen pelvis shows possible right lobe liver lesion, no other potentially contributing factors in the abdomen, chest, or pelvis.  Ultrasound right upper quadrant shows most likely hemangioma, but may be more definitively assessed with a nonemergent MRI.  Patient was given IV fluids, Tylenol, Zofran, and Benadryl.  Blood cultures were sent, and patient was given a dose of IV Zosyn per sepsis  protocol.  Patient case discussed with ED attending Dr. العراقي, who also saw and evaluated the patient.  Persistently symptomatic, complaining of a headache, generalized malaise, chest discomfort, etc. Given a 2nd liter IV fluids, Compazine, and clonidine for headache as requested.  Heart rate did normalize, remainder of vitals stable.  Patient has had chest pain, dyspnea, and tachycardia, history of anaphylaxis to iodinated contrast media, VQ scan ordered.    Patient is signed out to ED attending Dr. العراقي pending VQ scan, reevaluation, and final disposition.    This note has been transcribed using voice recognition and may contain grammatical errors, misplaced words, incorrect words, incorrect phrases or other errors.   Procedure  Procedures     Chata Johnson PA-C  08/31/24 9252

## 2024-08-31 NOTE — ED TRIAGE NOTES
Pt presents to the ED from urgent care feeling sick. Pt states she had an URI and was given antibiotics with no help. Pt was told to come here by urgent care. Pt endorses headache, chest pain and coughing. Pt states she has not been making urine, it burns when she does pee. Pt has hx of Emmanuel-Danlos and POTS. Denies SOB. Pt has not been able to eat d/t nausea and getting full quickly. Pt has a sore throat as well and has been shaky.

## 2024-09-01 VITALS
SYSTOLIC BLOOD PRESSURE: 126 MMHG | HEIGHT: 65 IN | OXYGEN SATURATION: 99 % | BODY MASS INDEX: 32.15 KG/M2 | RESPIRATION RATE: 16 BRPM | WEIGHT: 193 LBS | DIASTOLIC BLOOD PRESSURE: 71 MMHG | HEART RATE: 89 BPM | TEMPERATURE: 97.2 F

## 2024-09-01 LAB
ALBUMIN SERPL BCP-MCNC: 3.3 G/DL (ref 3.4–5)
ALP SERPL-CCNC: 138 U/L (ref 33–110)
ALT SERPL W P-5'-P-CCNC: 14 U/L (ref 7–45)
ANION GAP SERPL CALC-SCNC: 11 MMOL/L (ref 10–20)
AST SERPL W P-5'-P-CCNC: 15 U/L (ref 9–39)
BACTERIA BLD CULT: NORMAL
BACTERIA BLD CULT: NORMAL
BASOPHILS # BLD AUTO: 0.08 X10*3/UL (ref 0–0.1)
BASOPHILS NFR BLD AUTO: 1.3 %
BILIRUB SERPL-MCNC: 0.5 MG/DL (ref 0–1.2)
BUN SERPL-MCNC: 8 MG/DL (ref 6–23)
CALCIUM SERPL-MCNC: 8.3 MG/DL (ref 8.6–10.3)
CHLORIDE SERPL-SCNC: 108 MMOL/L (ref 98–107)
CO2 SERPL-SCNC: 24 MMOL/L (ref 21–32)
CREAT SERPL-MCNC: 0.65 MG/DL (ref 0.5–1.05)
CRP SERPL-MCNC: 0.54 MG/DL
EGFRCR SERPLBLD CKD-EPI 2021: >90 ML/MIN/1.73M*2
EOSINOPHIL # BLD AUTO: 0.29 X10*3/UL (ref 0–0.7)
EOSINOPHIL NFR BLD AUTO: 4.6 %
ERYTHROCYTE [DISTWIDTH] IN BLOOD BY AUTOMATED COUNT: 12.5 % (ref 11.5–14.5)
GLUCOSE BLD MANUAL STRIP-MCNC: 110 MG/DL (ref 74–99)
GLUCOSE SERPL-MCNC: 84 MG/DL (ref 74–99)
HCT VFR BLD AUTO: 37.9 % (ref 36–46)
HGB BLD-MCNC: 13.2 G/DL (ref 12–16)
HOLD SPECIMEN: NORMAL
IMM GRANULOCYTES # BLD AUTO: 0.01 X10*3/UL (ref 0–0.7)
IMM GRANULOCYTES NFR BLD AUTO: 0.2 % (ref 0–0.9)
LYMPHOCYTES # BLD AUTO: 2.54 X10*3/UL (ref 1.2–4.8)
LYMPHOCYTES NFR BLD AUTO: 40.7 %
MAGNESIUM SERPL-MCNC: 1.9 MG/DL (ref 1.6–2.4)
MCH RBC QN AUTO: 28.9 PG (ref 26–34)
MCHC RBC AUTO-ENTMCNC: 34.8 G/DL (ref 32–36)
MCV RBC AUTO: 83 FL (ref 80–100)
MONOCYTES # BLD AUTO: 0.58 X10*3/UL (ref 0.1–1)
MONOCYTES NFR BLD AUTO: 9.3 %
NEUTROPHILS # BLD AUTO: 2.74 X10*3/UL (ref 1.2–7.7)
NEUTROPHILS NFR BLD AUTO: 43.9 %
NRBC BLD-RTO: 0 /100 WBCS (ref 0–0)
PLATELET # BLD AUTO: 207 X10*3/UL (ref 150–450)
POTASSIUM SERPL-SCNC: 3.4 MMOL/L (ref 3.5–5.3)
PROT SERPL-MCNC: 5.4 G/DL (ref 6.4–8.2)
RBC # BLD AUTO: 4.57 X10*6/UL (ref 4–5.2)
SODIUM SERPL-SCNC: 140 MMOL/L (ref 136–145)
THEOPHYLLINE SERPL-MCNC: 19.2 UG/ML (ref 10–20)
WBC # BLD AUTO: 6.2 X10*3/UL (ref 4.4–11.3)

## 2024-09-01 PROCEDURE — 2500000004 HC RX 250 GENERAL PHARMACY W/ HCPCS (ALT 636 FOR OP/ED): Performed by: NURSE PRACTITIONER

## 2024-09-01 PROCEDURE — 96376 TX/PRO/DX INJ SAME DRUG ADON: CPT

## 2024-09-01 PROCEDURE — 2500000001 HC RX 250 WO HCPCS SELF ADMINISTERED DRUGS (ALT 637 FOR MEDICARE OP): Performed by: NURSE PRACTITIONER

## 2024-09-01 PROCEDURE — 99239 HOSP IP/OBS DSCHRG MGMT >30: CPT | Performed by: INTERNAL MEDICINE

## 2024-09-01 PROCEDURE — 80053 COMPREHEN METABOLIC PANEL: CPT | Performed by: NURSE PRACTITIONER

## 2024-09-01 PROCEDURE — 82947 ASSAY GLUCOSE BLOOD QUANT: CPT | Mod: 59

## 2024-09-01 PROCEDURE — 83735 ASSAY OF MAGNESIUM: CPT | Performed by: NURSE PRACTITIONER

## 2024-09-01 PROCEDURE — G0378 HOSPITAL OBSERVATION PER HR: HCPCS

## 2024-09-01 PROCEDURE — 2500000002 HC RX 250 W HCPCS SELF ADMINISTERED DRUGS (ALT 637 FOR MEDICARE OP, ALT 636 FOR OP/ED): Performed by: NURSE PRACTITIONER

## 2024-09-01 PROCEDURE — 96375 TX/PRO/DX INJ NEW DRUG ADDON: CPT

## 2024-09-01 PROCEDURE — 85025 COMPLETE CBC W/AUTO DIFF WBC: CPT | Performed by: NURSE PRACTITIONER

## 2024-09-01 PROCEDURE — 2500000004 HC RX 250 GENERAL PHARMACY W/ HCPCS (ALT 636 FOR OP/ED): Performed by: INTERNAL MEDICINE

## 2024-09-01 PROCEDURE — 36415 COLL VENOUS BLD VENIPUNCTURE: CPT | Performed by: NURSE PRACTITIONER

## 2024-09-01 PROCEDURE — 86140 C-REACTIVE PROTEIN: CPT | Performed by: INTERNAL MEDICINE

## 2024-09-01 RX ORDER — DEXTROSE 50 % IN WATER (D50W) INTRAVENOUS SYRINGE
12.5
Status: DISCONTINUED | OUTPATIENT
Start: 2024-09-01 | End: 2024-09-01 | Stop reason: HOSPADM

## 2024-09-01 RX ORDER — PAROXETINE HYDROCHLORIDE 20 MG/1
30 TABLET, FILM COATED ORAL EVERY MORNING
Status: DISCONTINUED | OUTPATIENT
Start: 2024-09-01 | End: 2024-09-01 | Stop reason: HOSPADM

## 2024-09-01 RX ORDER — GUAIFENESIN 600 MG/1
600 TABLET, EXTENDED RELEASE ORAL 2 TIMES DAILY PRN
Status: DISCONTINUED | OUTPATIENT
Start: 2024-09-01 | End: 2024-09-01 | Stop reason: HOSPADM

## 2024-09-01 RX ORDER — CETIRIZINE HYDROCHLORIDE 10 MG/1
10 TABLET ORAL 2 TIMES DAILY
Status: DISCONTINUED | OUTPATIENT
Start: 2024-09-01 | End: 2024-09-01 | Stop reason: HOSPADM

## 2024-09-01 RX ORDER — TALC
9 POWDER (GRAM) TOPICAL NIGHTLY PRN
Status: DISCONTINUED | OUTPATIENT
Start: 2024-09-01 | End: 2024-09-01 | Stop reason: HOSPADM

## 2024-09-01 RX ORDER — ROSUVASTATIN CALCIUM 20 MG/1
20 TABLET, COATED ORAL DAILY
Status: DISCONTINUED | OUTPATIENT
Start: 2024-09-01 | End: 2024-09-01 | Stop reason: HOSPADM

## 2024-09-01 RX ORDER — ENOXAPARIN SODIUM 100 MG/ML
40 INJECTION SUBCUTANEOUS EVERY 24 HOURS
Status: DISCONTINUED | OUTPATIENT
Start: 2024-09-01 | End: 2024-09-01

## 2024-09-01 RX ORDER — PREGABALIN 100 MG/1
100 CAPSULE ORAL EVERY OTHER DAY
Status: DISCONTINUED | OUTPATIENT
Start: 2024-09-02 | End: 2024-09-01 | Stop reason: HOSPADM

## 2024-09-01 RX ORDER — LEVOTHYROXINE SODIUM 75 UG/1
75 TABLET ORAL DAILY
Status: DISCONTINUED | OUTPATIENT
Start: 2024-09-01 | End: 2024-09-01 | Stop reason: HOSPADM

## 2024-09-01 RX ORDER — POTASSIUM CHLORIDE 14.9 MG/ML
20 INJECTION INTRAVENOUS ONCE
Status: COMPLETED | OUTPATIENT
Start: 2024-09-01 | End: 2024-09-01

## 2024-09-01 RX ORDER — ALBUTEROL SULFATE 0.83 MG/ML
2.5 SOLUTION RESPIRATORY (INHALATION) EVERY 2 HOUR PRN
Status: DISCONTINUED | OUTPATIENT
Start: 2024-09-01 | End: 2024-09-01 | Stop reason: HOSPADM

## 2024-09-01 RX ORDER — METHOCARBAMOL 500 MG/1
750 TABLET, FILM COATED ORAL EVERY 6 HOURS PRN
Status: DISCONTINUED | OUTPATIENT
Start: 2024-09-01 | End: 2024-09-01 | Stop reason: HOSPADM

## 2024-09-01 RX ORDER — ALBUTEROL SULFATE 0.83 MG/ML
2.5 SOLUTION RESPIRATORY (INHALATION) EVERY 6 HOURS PRN
Status: DISCONTINUED | OUTPATIENT
Start: 2024-09-01 | End: 2024-09-01

## 2024-09-01 RX ORDER — DIPHENHYDRAMINE HCL 25 MG
25 CAPSULE ORAL NIGHTLY PRN
Status: DISCONTINUED | OUTPATIENT
Start: 2024-09-01 | End: 2024-09-01 | Stop reason: HOSPADM

## 2024-09-01 RX ORDER — DOXYCYCLINE HYCLATE 100 MG
100 TABLET ORAL EVERY 12 HOURS SCHEDULED
Status: DISCONTINUED | OUTPATIENT
Start: 2024-09-01 | End: 2024-09-01 | Stop reason: HOSPADM

## 2024-09-01 RX ORDER — ENOXAPARIN SODIUM 100 MG/ML
40 INJECTION SUBCUTANEOUS EVERY 24 HOURS
Status: DISCONTINUED | OUTPATIENT
Start: 2024-09-01 | End: 2024-09-01 | Stop reason: HOSPADM

## 2024-09-01 RX ORDER — DEXTROSE 50 % IN WATER (D50W) INTRAVENOUS SYRINGE
25
Status: DISCONTINUED | OUTPATIENT
Start: 2024-09-01 | End: 2024-09-01 | Stop reason: HOSPADM

## 2024-09-01 RX ORDER — INSULIN LISPRO 100 [IU]/ML
0-5 INJECTION, SOLUTION INTRAVENOUS; SUBCUTANEOUS
Status: DISCONTINUED | OUTPATIENT
Start: 2024-09-01 | End: 2024-09-01

## 2024-09-01 RX ORDER — ESZOPICLONE 3 MG/1
3 TABLET, FILM COATED ORAL NIGHTLY
Status: DISCONTINUED | OUTPATIENT
Start: 2024-09-01 | End: 2024-09-01 | Stop reason: HOSPADM

## 2024-09-01 RX ORDER — DIPHENHYDRAMINE HYDROCHLORIDE 50 MG/ML
25 INJECTION INTRAMUSCULAR; INTRAVENOUS EVERY 4 HOURS PRN
Status: DISCONTINUED | OUTPATIENT
Start: 2024-09-01 | End: 2024-09-01 | Stop reason: HOSPADM

## 2024-09-01 RX ORDER — PANTOPRAZOLE SODIUM 40 MG/1
40 TABLET, DELAYED RELEASE ORAL
Status: DISCONTINUED | OUTPATIENT
Start: 2024-09-01 | End: 2024-09-01 | Stop reason: HOSPADM

## 2024-09-01 RX ORDER — HYDROXYZINE HYDROCHLORIDE 25 MG/1
25 TABLET, FILM COATED ORAL EVERY 6 HOURS PRN
Status: DISCONTINUED | OUTPATIENT
Start: 2024-09-01 | End: 2024-09-01 | Stop reason: HOSPADM

## 2024-09-01 RX ORDER — ONDANSETRON 4 MG/1
8 TABLET, ORALLY DISINTEGRATING ORAL EVERY 8 HOURS PRN
Status: DISCONTINUED | OUTPATIENT
Start: 2024-09-01 | End: 2024-09-01 | Stop reason: HOSPADM

## 2024-09-01 RX ORDER — CLONIDINE HYDROCHLORIDE 0.1 MG/1
0.1 TABLET ORAL 2 TIMES DAILY
Status: DISCONTINUED | OUTPATIENT
Start: 2024-09-01 | End: 2024-09-01 | Stop reason: HOSPADM

## 2024-09-01 RX ADMIN — POTASSIUM CHLORIDE 20 MEQ: 14.9 INJECTION, SOLUTION INTRAVENOUS at 09:50

## 2024-09-01 RX ADMIN — PANTOPRAZOLE SODIUM 40 MG: 40 TABLET, DELAYED RELEASE ORAL at 05:47

## 2024-09-01 RX ADMIN — LEVOTHYROXINE SODIUM 75 MCG: 0.07 TABLET ORAL at 05:46

## 2024-09-01 RX ADMIN — DOXYCYCLINE HYCLATE 100 MG: 100 TABLET, COATED ORAL at 10:03

## 2024-09-01 RX ADMIN — PAROXETINE 30 MG: 20 TABLET, FILM COATED ORAL at 10:03

## 2024-09-01 RX ADMIN — CETIRIZINE HYDROCHLORIDE 10 MG: 10 TABLET, FILM COATED ORAL at 10:03

## 2024-09-01 RX ADMIN — ROSUVASTATIN 20 MG: 20 TABLET, FILM COATED ORAL at 10:03

## 2024-09-01 RX ADMIN — ACETAMINOPHEN 650 MG: 325 TABLET ORAL at 01:12

## 2024-09-01 RX ADMIN — SODIUM CHLORIDE 1000 ML: 9 INJECTION, SOLUTION INTRAVENOUS at 03:23

## 2024-09-01 RX ADMIN — DIPHENHYDRAMINE HYDROCHLORIDE 25 MG: 50 INJECTION, SOLUTION INTRAMUSCULAR; INTRAVENOUS at 01:41

## 2024-09-01 SDOH — ECONOMIC STABILITY: TRANSPORTATION INSECURITY
IN THE PAST 12 MONTHS, HAS LACK OF TRANSPORTATION KEPT YOU FROM MEETINGS, WORK, OR FROM GETTING THINGS NEEDED FOR DAILY LIVING?: NO

## 2024-09-01 SDOH — HEALTH STABILITY: MENTAL HEALTH: HOW OFTEN DO YOU HAVE 6 OR MORE DRINKS ON ONE OCCASION?: NEVER

## 2024-09-01 SDOH — ECONOMIC STABILITY: HOUSING INSECURITY: AT ANY TIME IN THE PAST 12 MONTHS, WERE YOU HOMELESS OR LIVING IN A SHELTER (INCLUDING NOW)?: NO

## 2024-09-01 SDOH — ECONOMIC STABILITY: INCOME INSECURITY: IN THE PAST 12 MONTHS, HAS THE ELECTRIC, GAS, OIL, OR WATER COMPANY THREATENED TO SHUT OFF SERVICE IN YOUR HOME?: NO

## 2024-09-01 SDOH — ECONOMIC STABILITY: INCOME INSECURITY: IN THE LAST 12 MONTHS, WAS THERE A TIME WHEN YOU WERE NOT ABLE TO PAY THE MORTGAGE OR RENT ON TIME?: YES

## 2024-09-01 SDOH — SOCIAL STABILITY: SOCIAL INSECURITY: ABUSE: ADULT

## 2024-09-01 SDOH — SOCIAL STABILITY: SOCIAL NETWORK: ARE YOU MARRIED, WIDOWED, DIVORCED, SEPARATED, NEVER MARRIED, OR LIVING WITH A PARTNER?: MARRIED

## 2024-09-01 SDOH — SOCIAL STABILITY: SOCIAL INSECURITY: HAVE YOU HAD ANY THOUGHTS OF HARMING ANYONE ELSE?: NO

## 2024-09-01 SDOH — SOCIAL STABILITY: SOCIAL INSECURITY: HAVE YOU HAD THOUGHTS OF HARMING ANYONE ELSE?: NO

## 2024-09-01 SDOH — ECONOMIC STABILITY: FOOD INSECURITY: WITHIN THE PAST 12 MONTHS, THE FOOD YOU BOUGHT JUST DIDN'T LAST AND YOU DIDN'T HAVE MONEY TO GET MORE.: NEVER TRUE

## 2024-09-01 SDOH — HEALTH STABILITY: MENTAL HEALTH
STRESS IS WHEN SOMEONE FEELS TENSE, NERVOUS, ANXIOUS, OR CAN'T SLEEP AT NIGHT BECAUSE THEIR MIND IS TROUBLED. HOW STRESSED ARE YOU?: VERY MUCH

## 2024-09-01 SDOH — SOCIAL STABILITY: SOCIAL INSECURITY: ARE THERE ANY APPARENT SIGNS OF INJURIES/BEHAVIORS THAT COULD BE RELATED TO ABUSE/NEGLECT?: NO

## 2024-09-01 SDOH — SOCIAL STABILITY: SOCIAL INSECURITY: DOES ANYONE TRY TO KEEP YOU FROM HAVING/CONTACTING OTHER FRIENDS OR DOING THINGS OUTSIDE YOUR HOME?: NO

## 2024-09-01 SDOH — ECONOMIC STABILITY: INCOME INSECURITY: HOW HARD IS IT FOR YOU TO PAY FOR THE VERY BASICS LIKE FOOD, HOUSING, MEDICAL CARE, AND HEATING?: NOT VERY HARD

## 2024-09-01 SDOH — HEALTH STABILITY: MENTAL HEALTH: HOW MANY STANDARD DRINKS CONTAINING ALCOHOL DO YOU HAVE ON A TYPICAL DAY?: PATIENT DOES NOT DRINK

## 2024-09-01 SDOH — SOCIAL STABILITY: SOCIAL INSECURITY: DO YOU FEEL UNSAFE GOING BACK TO THE PLACE WHERE YOU ARE LIVING?: NO

## 2024-09-01 SDOH — ECONOMIC STABILITY: FOOD INSECURITY: WITHIN THE PAST 12 MONTHS, YOU WORRIED THAT YOUR FOOD WOULD RUN OUT BEFORE YOU GOT MONEY TO BUY MORE.: NEVER TRUE

## 2024-09-01 SDOH — HEALTH STABILITY: MENTAL HEALTH: HOW OFTEN DO YOU HAVE A DRINK CONTAINING ALCOHOL?: NEVER

## 2024-09-01 SDOH — SOCIAL STABILITY: SOCIAL INSECURITY: HAS ANYONE EVER THREATENED TO HURT YOUR FAMILY OR YOUR PETS?: NO

## 2024-09-01 SDOH — SOCIAL STABILITY: SOCIAL INSECURITY: WERE YOU ABLE TO COMPLETE ALL THE BEHAVIORAL HEALTH SCREENINGS?: YES

## 2024-09-01 SDOH — ECONOMIC STABILITY: HOUSING INSECURITY: IN THE PAST 12 MONTHS, HOW MANY TIMES HAVE YOU MOVED WHERE YOU WERE LIVING?: 1

## 2024-09-01 SDOH — SOCIAL STABILITY: SOCIAL INSECURITY: DO YOU FEEL ANYONE HAS EXPLOITED OR TAKEN ADVANTAGE OF YOU FINANCIALLY OR OF YOUR PERSONAL PROPERTY?: NO

## 2024-09-01 SDOH — SOCIAL STABILITY: SOCIAL INSECURITY: ARE YOU OR HAVE YOU BEEN THREATENED OR ABUSED PHYSICALLY, EMOTIONALLY, OR SEXUALLY BY ANYONE?: NO

## 2024-09-01 SDOH — ECONOMIC STABILITY: TRANSPORTATION INSECURITY
IN THE PAST 12 MONTHS, HAS THE LACK OF TRANSPORTATION KEPT YOU FROM MEDICAL APPOINTMENTS OR FROM GETTING MEDICATIONS?: NO

## 2024-09-01 ASSESSMENT — PATIENT HEALTH QUESTIONNAIRE - PHQ9
2. FEELING DOWN, DEPRESSED OR HOPELESS: NOT AT ALL
1. LITTLE INTEREST OR PLEASURE IN DOING THINGS: NOT AT ALL
SUM OF ALL RESPONSES TO PHQ9 QUESTIONS 1 & 2: 0

## 2024-09-01 ASSESSMENT — LIFESTYLE VARIABLES
AUDIT-C TOTAL SCORE: 0
HOW OFTEN DO YOU HAVE 6 OR MORE DRINKS ON ONE OCCASION: NEVER
HOW MANY STANDARD DRINKS CONTAINING ALCOHOL DO YOU HAVE ON A TYPICAL DAY: PATIENT DOES NOT DRINK
HOW OFTEN DO YOU HAVE A DRINK CONTAINING ALCOHOL: NEVER
AUDIT-C TOTAL SCORE: 0
AUDIT-C TOTAL SCORE: 0
SKIP TO QUESTIONS 9-10: 1
SKIP TO QUESTIONS 9-10: 1

## 2024-09-01 ASSESSMENT — COGNITIVE AND FUNCTIONAL STATUS - GENERAL
MOBILITY SCORE: 24
PATIENT BASELINE BEDBOUND: NO
DAILY ACTIVITIY SCORE: 24

## 2024-09-01 ASSESSMENT — ACTIVITIES OF DAILY LIVING (ADL)
HEARING - RIGHT EAR: FUNCTIONAL
FEEDING YOURSELF: INDEPENDENT
WALKS IN HOME: INDEPENDENT
ADEQUATE_TO_COMPLETE_ADL: YES
TOILETING: INDEPENDENT
DRESSING YOURSELF: INDEPENDENT
PATIENT'S MEMORY ADEQUATE TO SAFELY COMPLETE DAILY ACTIVITIES?: YES
LACK_OF_TRANSPORTATION: NO
BATHING: INDEPENDENT
LACK_OF_TRANSPORTATION: NO
GROOMING: INDEPENDENT
JUDGMENT_ADEQUATE_SAFELY_COMPLETE_DAILY_ACTIVITIES: YES
HEARING - LEFT EAR: FUNCTIONAL

## 2024-09-01 ASSESSMENT — PAIN SCALES - GENERAL: PAINLEVEL_OUTOF10: 3

## 2024-09-01 ASSESSMENT — PAIN SCALES - WONG BAKER: WONGBAKER_NUMERICALRESPONSE: NO HURT

## 2024-09-01 NOTE — H&P
History Of Present Illness  Lois Fisher is a 39 y.o. female w PMH of POTS with many falls in life w concussions, gets around in a wheelchair due to this at times,  anxiety, depression, zenia-danlos disease, epilepsy, gastroparesis, who  presents to the ED with a chief complaint of feeling flu like/URI symptoms including chills, fever, fatigue, sore throat, productive cough with yellowish phlegm, brain fog,  decreased appetite, shortness of breath on exertion and urinary frequency, dysuria, with decreased urine output for the past 2 weeks. She reports the decreased appetite has led to about 5-10 lbs of weight loss recently. Of note, she reports moving into an apartment with her family and being exposed to mold prior to this as well. She was seen in urgent care a week ago  and was prescribed cefdinir, but developed itching. Was switched to doxycycline 5 days ago and dx'd with bronchitis, but still has not gotten any better.  She went to urgent care again today because the symptoms have not improved. She reports urgent care provider advising her to be seen in the ED for further care. Additionally, she reports very stressful circumstances in life. Just moved from [place], has moved 5 x recently, her son is special needs, and she and her family have had to move back in with her in laws. She states that she has some PTSD and some low grade abdominal pain and nausea for a while.     ED Course:   Of note, when pt was taken to go get a test here form ED, she syncopized which is normal for her as she reports this happens with the POTS. She received tylenol, benadryl, clonicdine 0.1mg, LR 1L, NS 1L, reglan 10mg, zofran, zosyn, and lyrica 100mg. The labs show nothing remarkable. The covid, group a strep. CTAP notable for possible R lobe liver lesion, however, on the  RUQ US, the report cited that it was difficult to assess, but concerning for likely hemangioma; consideration for liver MRI for more definitive assessment. The  VQ Scan was normal. The CT of the head showed no acute or concerning process.      Past Medical History  She has a past medical history of Anxiety and depression, Dysautonomia (Multi), Emmanuel-Danlos disease (HHS-HCC), Focal epilepsy (Multi), Gastric ulcer, chronic, Gastroparesis, Hyperlipemia, Hypothyroidism, Idiopathic postprandial hypoglycemia, Post concussion syndrome, and Prinzmetal angina (CMS-HCC).    Surgical History  She has a past surgical history that includes  section, low transverse; Appendectomy; Cholecystectomy; Bile duct stent placement; Biliary drainage catheter placement w/ bile duct tube change; and pacemaker placement.     Social History  She reports that she has never smoked. She has never used smokeless tobacco. She reports current alcohol use. She reports current drug use. Drug: Marijuana.    Family History  Family History   Adopted: Yes   Problem Relation Name Age of Onset    Hyperlipidemia Mother      Lead poisoning Father      Colon cancer Father      Heart attack Father      Drug abuse Sister      Heart attack Brother      Drug abuse Brother      Autism Son      Asthma Son      Allergies Son      Club foot Son      Other (Heart bypass) Mother's Sister      Heart attack Maternal Grandfather      Heart attack Paternal Grandfather          Allergies  Honey, Iodinated contrast media, Adhesive tape-silicones, Compazine [prochlorperazine], Ibuprofen, Latex, Neosporin (ooh-hjm-xoxxd) [neomycin-bacitracnzn-polymyxnb], Penicillin, Quinolones, Toradol [ketorolac], Tropical fruit flavor, Vancomycin, and Sulfa (sulfonamide antibiotics)        PHYSICAL EXAM:  GENERAL: well appearing, well nourished, Alert, NAD, cooperative wearing a mask   SKIN: Warm and dry.   HEENT: MMM, neck supple, trachea midline  LUNGS: Unlabored , CTAB  CARDS: RRR, no JVD, no edema  GI: Soft, NTND, BS+, no rebound, no guarding   : pure wick in place  MS/Extremities: WWP, no edema, distal pulses intact, moves all  extremities  NEURO: A&Ox3, grossly nonfocal exam, speech fluent, follows commands, answers questions appropriately  PSYCH: a bit anxious at times, normal behavior       Last Recorded Vitals  /81   Pulse 95   Temp 35.8 °C (96.4 °F) (Temporal)   Resp 15   Wt 87.5 kg (193 lb)   SpO2 97%     Relevant Results  Meds reviewed  Results for orders placed or performed during the hospital encounter of 08/31/24 (from the past 24 hour(s))   CBC and Auto Differential   Result Value Ref Range    WBC 8.5 4.4 - 11.3 x10*3/uL    nRBC 0.0 0.0 - 0.0 /100 WBCs    RBC 5.41 (H) 4.00 - 5.20 x10*6/uL    Hemoglobin 15.7 12.0 - 16.0 g/dL    Hematocrit 46.4 (H) 36.0 - 46.0 %    MCV 86 80 - 100 fL    MCH 29.0 26.0 - 34.0 pg    MCHC 33.8 32.0 - 36.0 g/dL    RDW 12.5 11.5 - 14.5 %    Platelets 266 150 - 450 x10*3/uL    Neutrophils % 56.0 40.0 - 80.0 %    Immature Granulocytes %, Automated 0.2 0.0 - 0.9 %    Lymphocytes % 29.7 13.0 - 44.0 %    Monocytes % 8.3 2.0 - 10.0 %    Eosinophils % 4.6 0.0 - 6.0 %    Basophils % 1.2 0.0 - 2.0 %    Neutrophils Absolute 4.74 1.20 - 7.70 x10*3/uL    Immature Granulocytes Absolute, Automated 0.02 0.00 - 0.70 x10*3/uL    Lymphocytes Absolute 2.51 1.20 - 4.80 x10*3/uL    Monocytes Absolute 0.70 0.10 - 1.00 x10*3/uL    Eosinophils Absolute 0.39 0.00 - 0.70 x10*3/uL    Basophils Absolute 0.10 0.00 - 0.10 x10*3/uL   Comprehensive Metabolic Panel   Result Value Ref Range    Glucose 101 (H) 74 - 99 mg/dL    Sodium 139 136 - 145 mmol/L    Potassium 4.1 3.5 - 5.3 mmol/L    Chloride 102 98 - 107 mmol/L    Bicarbonate 27 21 - 32 mmol/L    Anion Gap 14 10 - 20 mmol/L    Urea Nitrogen 12 6 - 23 mg/dL    Creatinine 0.79 0.50 - 1.05 mg/dL    eGFR >90 >60 mL/min/1.73m*2    Calcium 9.5 8.6 - 10.3 mg/dL    Albumin 4.7 3.4 - 5.0 g/dL    Alkaline Phosphatase 181 (H) 33 - 110 U/L    Total Protein 6.9 6.4 - 8.2 g/dL    AST 21 9 - 39 U/L    Bilirubin, Total 0.5 0.0 - 1.2 mg/dL    ALT 22 7 - 45 U/L   Lactate   Result Value  Ref Range    Lactate 0.8 0.4 - 2.0 mmol/L   Blood Culture    Specimen: Peripheral Venipuncture; Blood culture   Result Value Ref Range    Blood Culture Loaded on Instrument - Culture in progress    Blood Culture    Specimen: Peripheral Venipuncture; Blood culture   Result Value Ref Range    Blood Culture Loaded on Instrument - Culture in progress    B-Type Natriuretic Peptide   Result Value Ref Range    BNP 7 0 - 99 pg/mL   Troponin I, High Sensitivity, Initial   Result Value Ref Range    Troponin I, High Sensitivity <3 0 - 13 ng/L   Human Chorionic Gonadotropin, Serum Quantitative   Result Value Ref Range    HCG, Beta-Quantitative <2 <5 mIU/mL   Sars-CoV-2 PCR   Result Value Ref Range    Coronavirus 2019, PCR Not Detected Not Detected   Group A Streptococcus, PCR    Specimen: Throat/Pharynx; Swab   Result Value Ref Range    Group A Strep PCR Not Detected Not Detected   Urinalysis with Reflex Culture and Microscopic   Result Value Ref Range    Color, Urine Yellow Light-Yellow, Yellow, Dark-Yellow    Appearance, Urine Clear Clear    Specific Gravity, Urine 1.034 1.005 - 1.035    pH, Urine 5.5 5.0, 5.5, 6.0, 6.5, 7.0, 7.5, 8.0    Protein, Urine 30 (1+) (A) NEGATIVE, 10 (TRACE), 20 (TRACE) mg/dL    Glucose, Urine Normal Normal mg/dL    Blood, Urine NEGATIVE NEGATIVE    Ketones, Urine NEGATIVE NEGATIVE mg/dL    Bilirubin, Urine NEGATIVE NEGATIVE    Urobilinogen, Urine Normal Normal mg/dL    Nitrite, Urine NEGATIVE NEGATIVE    Leukocyte Esterase, Urine NEGATIVE NEGATIVE   Urinalysis Microscopic   Result Value Ref Range    WBC, Urine 1-5 1-5, NONE /HPF    RBC, Urine 1-2 NONE, 1-2, 3-5 /HPF    Squamous Epithelial Cells, Urine 10-25 (FEW) Reference range not established. /HPF    Bacteria, Urine 1+ (A) NONE SEEN /HPF    Mucus, Urine 2+ Reference range not established. /LPF   Troponin, High Sensitivity, 1 Hour   Result Value Ref Range    Troponin I, High Sensitivity 3 0 - 13 ng/L   Mononucleosis screen   Result Value Ref  Range    Mononucleosis Screen Negative Negative   Creatine Kinase   Result Value Ref Range    Creatine Kinase 83 0 - 215 U/L      NM Lung perfusion with spect/ct    Result Date: 8/31/2024  Interpreted By:  Ronald Galo, STUDY: NM LUNG PERFUSION WITH SPECT/CT;  8/31/2024 7:50 pm   INDICATION: Signs/Symptoms:chest pain dyspnea tachycardia.   COMPARISON: None.   ACCESSION NUMBER(S): [id number]   ORDERING CLINICIAN: LAUREN ANGEL   TECHNIQUE: DIVISION OF NUCLEAR MEDICINE PERFUSION LUNG SCAN   Perfusion images of the lungs were acquired after the intravenous administration of 4.3 mCi of Tc-99m macroaggregated albumin (MAA).   FINDINGS: Perfusion images demonstrate a normal distribution of radiopharmaceutical throughout the lung fields.   There are no segmental perfusion abnormalities .         Normal perfusion lung scan.     MACRO: None.     Signed by: Ronald Galo 8/31/2024 8:25 PM Dictation workstation:   [id number]    US right upper quadrant    Result Date: 8/31/2024  Interpreted By:  Angélica Levin, STUDY: US RIGHT UPPER QUADRANT;  8/31/2024 4:32 pm   INDICATION: Signs/Symptoms:leukocytosis abnormal CT.   COMPARISON: CT, 08/31/2024.   ACCESSION NUMBER(S): [id number]   ORDERING CLINICIAN: LAUREN ANGEL   TECHNIQUE: Multiple images of the right upper quadrant were obtained.   FINDINGS: Assessment limited by patient habitus and bowel gas.   LIVER: Liver size and contour are within normal limits. Suspected echogenic lesion noted inferior right liver in the general area of the hypodensity on CT although difficult to assess due to rib shadowing probably best seen on the cine clips, estimated size of up to 1.7 cm. It is homogeneous.   GALLBLADDER: Surgically absent.   BILE DUCTS: No evidence of intra or extrahepatic biliary dilatation is identified; the common bile duct measures 0.3 cm .   PANCREAS: The visualized pancreas is unremarkable in appearance.   RIGHT KIDNEY: The right kidney measures 9.4 cm in  length. The renal cortical echogenicity and thickness are within normal limit.  No hydronephrosis or renal calculi are seen.       Difficult to assess hepatic lesion due to rib shadowing, patient habitus, and bowel gas, however I would favor the hypoattenuating area on CT corresponds to an approximate 1.7 cm echogenic lesion in the inferior right liver, most likely a hemangioma although could be more definitively assessed with a nonemergent liver MRI. The liver has no cirrhotic features.   MACRO: none   Signed by: Angélica Levin 8/31/2024 5:41 PM Dictation workstation:   [id number]    CT chest abdomen pelvis wo IV contrast    Result Date: 8/31/2024  Interpreted By:  Mikel Shea, STUDY: CT CHEST ABDOMEN PELVIS WO CONTRAST;  8/31/2024 2:47 pm   INDICATION: Signs/Symptoms:fever, sepsis, cough, chest pain, abdominal pain, dysuria.     COMPARISON: Two views of the chest earlier same day 31 August 2024   ACCESSION NUMBER(S): [id number]   ORDERING CLINICIAN: LAUREN ANGEL   TECHNIQUE: CT chest, abdomen and pelvis without intravenous or oral contrast   FINDINGS: CT CHEST   LUNGS / AIRSPACES / AIRWAYS:   LARGE AIRWAYS Filling defect: Negative Wall thickening: Negative Bronchiectasis: Negative Other: N/A   AIRSPACES Fibrosis: Negative Emphysema: Negative Consolidation: Negative Ground glass airspace disease: Negative Edema: Negative Nodule / Mass: Negative Other: N/A   PLEURA: Effusion: Both sides negative Pneumothorax: Both sides negative Other: n/a   CARDIOVASCULAR: Heart size: Within normal limits Pericardial effusion: Negative Thoracic aortic aneurysm: Negative Pulmonary arteries: No ectasia Heart failure change: Negative.  No sign of interstitial or alveolar edema. Other: n/a   NONVASCULAR MEDIASTINUM: Esophagus: Grossly normal by CT Mediastinal Mass: Negative Hiatal hernia: None Other: Residual thymic tissue without suspect features   LYMPH NODES: No thoracic adenopathy   CHEST WALL: No obvious breast abscess. No large  fluid collection around the cardiac device generator   SKELETON: Compelling CT evidence for acute discitis-osteomyelitis   -------   CT ABDOMEN AND PELVIS   LIVER: Evaluation without contrast is limited, question large lesion versus pseudo lesion in the inferior right lobe, for example coronal image series 605, image 54, which I have annotated. No other potential focal abnormality. Not fatty, enlarged or cirrhotic   SPLEEN: Normal. No enlargement.   PANCREAS: Normal. No CT evidence of acute or chronic pancreatitis. No obvious  duct dilation. No gross evidence of a mass, noting low sensitivity and specificity without IV contrast.   GALLBLADDER: Surgically absent.   BILE DUCTS: Normal. No biliary duct dilation.   ADRENAL GLANDS: Normal. No nodule or mass.   KIDNEYS AND URETERS: Normal.  No hydronephrosis on either side.  No radiodense stone.  Radiolucent stones could be occult on CT.  No gross evidence of a solid mass, noting low sensitivity and specificity without IV contrast.   LYMPH NODES: No adenopathy, intraperitoneal, retroperitoneal, pelvic, inguinal or otherwise.   APPENDIX: Surgically absent.   COLON: Normal. No sign of acute diverticulitis or other colitis. No annular constricting mass.   SMALL BOWEL: Normal. No small bowel dilation or any other sign of small bowel obstruction.   STOMACH / DUODENUM: Grossly normal by CT which has limited sensitivity and specificity for the stomach and duodenum.   RETROPERITONEUM: Normal.  No acute hemorrhage or inflammatory change. Lymph nodes in a separate dedicated section.   OMENTUM, MESENTERY AND PERITONEAL SPACES: Free intraperitoneal air: Negative Free intraperitoneal fluid: Negative Abscess: Negative Other: n/a   URINARY BLADDER: Normal. No wall thickening, large diverticula, radiodense stone or surrounding inflammatory change.   PELVIS: No obvious acute adnexal abnormality by CT   VASCULATURE: Unenhanced CT appearance within normal limits. No abdominal aortic  aneurysm or other acute finding.   ABDOMINAL WALL: Hernia: Negative Other: No acute or contributory abnormality.   SKELETON: No compelling CT evidence for acute discitis-osteomyelitis       Possible but not definitive substantial size right lobe liver lesion will ultimately need further evaluation with ultrasound or CT with contrast   No other potentially contributory findings in the abdomen; none at all in the chest or pelvis   No consolidative pneumonia or ground-glass airspace disease   No pleural or pericardial effusion   No compelling CT evidence for acute discitis-osteomyelitis anywhere in the thoracolumbar spine   No acute diverticulitis or other colitis   No bowel obstruction, perforation, abscess or free fluid   No hydronephrosis/urinary stone or acute pancreatitis   No biliary duct dilation status post cholecystectomy   MACRO: None   Signed by: Mikel Shea 8/31/2024 3:34 PM Dictation workstation:   [id number]    CT head wo IV contrast    Result Date: 8/31/2024  Interpreted By:  Mikel Shea, STUDY: CT HEAD WO IV CONTRAST;  8/31/2024 2:47 pm   INDICATION: Signs/Symptoms:fever, syncope.     COMPARISON: None   ACCESSION NUMBER(S): [id number]   ORDERING CLINICIAN: LAUREN ANGEL   TECHNIQUE: CT of the brain from the skull vertex to the skull base, without intravenous contrast   FINDINGS: ACUTE INTRA-AXIAL HEMORRHAGE:  Negative   ACUTE EXTRA-AXIAL/SUBDURAL HEMORRHAGE:  Negative   ACUTE INTRACRANIAL MASS EFFECT:  Negative   CT EVIDENCE OF ACUTE / SUBACUTE TERRITORIAL ISCHEMIA:  Negative   VENTRICLES:  Normal caliber and configuration   OTHER BRAIN FINDINGS:  No additional findings to note   INCLUDED PARANASAL SINUSES: All clear   INCLUDED MASTOID AIR CELLS: All clear   SKULL:  No lytic or blastic lesion   EXTRACRANIAL SOFT TISSUES:  Scalp and occular globes grossly normal by CT       NO ACUTE INTRACRANIAL PROCESS   MACRO: None   Signed by: Mikel Shea 8/31/2024 3:33 PM Dictation workstation:    [id number]    XR chest 2 views    Result Date: 8/31/2024  Interpreted By:  Roberth Calixto, STUDY: XR CHEST 2 VIEWS;  8/31/2024 1:18 pm   INDICATION: Signs/Symptoms:fever cough chest pain.     COMPARISON: None.   ACCESSION NUMBER(S): [id number]   ORDERING CLINICIAN: LAUREN ANGEL   FINDINGS: CARDIOMEDIASTINAL SILHOUETTE: Left-sided dual lead cardiac pacing device present with lead tips at the right atrium and right ventricle levels respectively. Cardiac silhouette is not enlarged.   LUNGS: Lungs are clear.  No pleural effusion or pneumothorax.   ABDOMEN: No remarkable upper abdominal findings.   BONES: No acute osseous changes.       1.  No evidence of acute cardiopulmonary process.       MACRO: None.   Signed by: Roberth Calixto 8/31/2024 1:45 PM Dictation workstation:   [id number]            Assessment & Plan  Flu-like symptoms  #Generalized complaints  -CBC: no leukocytosis, no acute anemia, no thrombocytopenia  -CMP: no acute electrolyte abnormalities, no acute renal or liver dysfunction appreciated   -Lactate, BNP, troponins, hcg, urine negative.  -covid, strep, mono negative.   -Theophylline level ordered  -Blood cultures were drawn in the ED  -repeat chem and blood count labs in AM   -Due to pt not eating much and feeling dehydrated, did provide NS at 75ml/hr   -EKG: Intermittent paced SR, non ischemic, rate in the 70's   -GI ppx: Protonix, bowel regimen prn   -VTE ppx: lovenox 40mg    #Bronchitis  Continue doxycycline  PRN albuterol   Education provided    #Concern for liver lesion  -CT and RUQ US inconclusive due to body habitus and gas  -Consider further imaging   > MRI would likely have to be done OP 2/2 pt w PPM and pr system, not compatible with out machines here    #Anxiety  #Depression  #Gastroparesis  #Chronic Nausea  #Insomnia  #Chronic pain   #HLD  -Resume home meds     #High stress life circumstances  -Pt declined SW consultation at this time   -Support provided      90 minutes  spent  with > 50% in lengthy record review and  going through all the issues she is having.    Shanell Blair, APRN-CNP

## 2024-09-01 NOTE — CARE PLAN
The clinical goals for the shift include decrease symptoms      Problem: Safety - Adult  Goal: Free from fall injury  Outcome: Progressing     Problem: Chronic Conditions and Co-morbidities  Goal: Patient's chronic conditions and co-morbidity symptoms are monitored and maintained or improved  Outcome: Progressing     Problem: Fall/Injury  Goal: Not fall by end of shift  Outcome: Progressing  Goal: Be free from injury by end of the shift  Outcome: Progressing     Problem: Pain  Goal: Walks with improved pain control throughout the shift  Outcome: Progressing  Goal: Free from opioid side effects throughout the shift  Outcome: Progressing     Problem: Nutrition  Goal: Oral intake greater than 50%  Outcome: Progressing  Goal: Adequate PO fluid intake  Outcome: Progressing

## 2024-09-01 NOTE — PROGRESS NOTES
09/01/24 1009   Discharge Planning   Living Arrangements Spouse/significant other;Children;Family members   Support Systems Spouse/significant other;Family members   Assistance Needed none   Type of Residence Private residence   Who is requesting discharge planning? Provider   Home or Post Acute Services None   Expected Discharge Disposition Home   Does the patient need discharge transport arranged? No   Financial Resource Strain   How hard is it for you to pay for the very basics like food, housing, medical care, and heating? Not very   Housing Stability   In the last 12 months, was there a time when you were not able to pay the mortgage or rent on time? Y   In the past 12 months, how many times have you moved where you were living? 1   At any time in the past 12 months, were you homeless or living in a shelter (including now)? N   Transportation Needs   In the past 12 months, has lack of transportation kept you from medical appointments or from getting medications? no   In the past 12 months, has lack of transportation kept you from meetings, work, or from getting things needed for daily living? No     9/1/24 1010  Patient lives with her , child, and in-laws in a house.  She is independent with ADLs.  She can ambulate but uses a wheelchair due to frequent syncopal episodes related to POTS.  Her family recently had to move from [place] to live with her in-laws for additional assistance.  Plan to return home with no needs.  SW consult placed for multiple stressors.  Elen Sandhu RN [facility]

## 2024-09-01 NOTE — DISCHARGE SUMMARY
"Admitting Provider: AVERY Covarrubias-CNP  Discharge Provider: Gentry Huitron MD  Primary Care Physician at Discharge: Luanne Ortiz [phone]   Admission Date: 8/31/2024     Discharge Date: 9/1/2024  Current Planned Discharge Disposition: Home    Discharge Diagnoses  Principal Problem:    Flu-like symptoms      Hospital Course  39 yoF with URI/bronchitis.    Work-up otherwise unremarkable/reassuring. She felt better the next day. I advised OTC meds for her symptoms. Discharged home in stable condition.     Test Results Pending At Discharge  Pending Labs       Order Current Status    Theophylline In process    Blood Culture Preliminary result    Blood Culture Preliminary result            Pertinent Physical Exam At Time of Discharge  /71 (BP Location: Left arm, Patient Position: Lying)   Pulse 89   Temp 36.2 °C (97.2 °F) (Temporal)   Resp 16   Ht 1.651 m (5' 5\")   Wt 87.5 kg (193 lb)   SpO2 99%   BMI 32.12 kg/m²   Physical Exam  Cardiovascular:      Rate and Rhythm: Normal rate and regular rhythm.      Heart sounds: Normal heart sounds.   Pulmonary:      Breath sounds: Normal breath sounds.   Abdominal:      General: Bowel sounds are normal.      Palpations: Abdomen is soft.   Musculoskeletal:         General: Normal range of motion.   Neurological:      General: No focal deficit present.      Mental Status: She is alert and oriented to person, place, and time.   Psychiatric:         Mood and Affect: Mood normal.         Home Medications     Medication List      CONTINUE taking these medications     Benadryl Allergy 25 mg tablet; Generic drug: diphenhydrAMINE   brivaracetam 100 mg tablet tablet; Commonly known as: Briviact; Take 1   tablet (100 mg) by mouth 2 times a day.   * cetirizine 10 mg chewable tablet; Commonly known as: ZyrTEC   * cetirizine 10 mg tablet; Commonly known as: ZyrTEC; Take 1 tablet (10   mg) by mouth 2 times a day.   cloNIDine 0.1 mg tablet; Commonly known as: " Catapres; Take 1 tablet (0.1   mg) by mouth 2 times a day.   doxycycline 100 mg capsule; Commonly known as: Vibramycin; Take 1   capsule (100 mg) by mouth 2 times a day for 10 days. Take with at least 8   ounces (large glass) of water, do not lie down for 30 minutes after   eszopiclone 3 mg tablet; Commonly known as: Lunesta; Take 1 tablet (3   mg) by mouth once daily at bedtime. Take immediately before bedtime   hydrOXYzine HCL 25 mg tablet; Commonly known as: Atarax   levothyroxine 75 mcg tablet; Commonly known as: Synthroid, Levoxyl; Take   1 tablet (75 mcg) by mouth early in the morning.. Take on an empty stomach   at the same time each day, either 30 to 60 minutes prior to breakfast   melatonin 10 mg capsule   methocarbamol 750 mg tablet; Commonly known as: Robaxin   Nucynta 50 mg tablet; Generic drug: tapentadol   ondansetron ODT 8 mg disintegrating tablet; Commonly known as:   Zofran-ODT; Take 1 tablet (8 mg) by mouth every 8 hours if needed for   nausea or vomiting.   pantoprazole 40 mg EC tablet; Commonly known as: ProtoNix; Take 1 tablet   (40 mg) by mouth once daily in the morning. Take before meals. Do not   crush, chew, or split.   PARoxetine 30 mg tablet; Commonly known as: Paxil; Take 1 tablet (30 mg)   by mouth once daily in the morning.   pregabalin 100 mg capsule; Commonly known as: Lyrica   rosuvastatin 20 mg tablet; Commonly known as: Crestor; Take 1 tablet (20   mg) by mouth once daily.   theophylline  mg 24 hr capsule; Commonly known as: Pedro-24  * This list has 2 medication(s) that are the same as other medications   prescribed for you. Read the directions carefully, and ask your doctor or   other care provider to review them with you.       Outpatient Follow-Up  Future Appointments   Date Time Provider Department Center   9/16/2024 10:20 AM Amanuel Tang MD [id number]   11/12/2024  1:00 PM Karon Weber, APRN-CNP [id number]       Gentry Huitron MD    I spent more than 30  min coordinating this patient's discharge.

## 2024-09-03 ENCOUNTER — PATIENT OUTREACH (OUTPATIENT)
Dept: PRIMARY CARE | Facility: CLINIC | Age: 39
End: 2024-09-03
Payer: COMMERCIAL

## 2024-09-03 ENCOUNTER — TELEPHONE (OUTPATIENT)
Dept: PRIMARY CARE | Facility: CLINIC | Age: 39
End: 2024-09-03
Payer: COMMERCIAL

## 2024-09-03 LAB
ATRIAL RATE: 98 BPM
P AXIS: 59 DEGREES
P OFFSET: 207 MS
P ONSET: 156 MS
PR INTERVAL: 132 MS
Q ONSET: 222 MS
QRS COUNT: 16 BEATS
QRS DURATION: 74 MS
QT INTERVAL: 350 MS
QTC CALCULATION(BAZETT): 446 MS
QTC FREDERICIA: 412 MS
R AXIS: 42 DEGREES
T AXIS: -2 DEGREES
T OFFSET: 397 MS
VENTRICULAR RATE: 98 BPM

## 2024-09-03 NOTE — PROGRESS NOTES
Patient states transportation is an issue for her with her current epilepsy and is asking if HHC could be ordered for her for PT/OT needs. States she wants to try using  HHC first if possible.

## 2024-09-03 NOTE — TELEPHONE ENCOUNTER
Left vmail for patient to call [facility] office to be scheduled for a hospital follow-up to be able to get a referral for [facility]HA since it was not given at discharge.

## 2024-09-03 NOTE — PROGRESS NOTES
Discharge Facility: [facility]   Discharge Diagnosis: Flu-like symptoms; Chest Pain; Tachycardia; syncope and collapse   Admission Date: 8/31/2024   Discharge Date: 9/1/2024     PCP Appointment Date: TBD-office tasked to arrange fup with PCP as needed  Specialist Appointment Date: TBD with neurology; cardiology; pain management  Hospital Encounter and Summary Linked: Yes  See discharge assessment below for further details    Engagement  Call Start Time: 1259 (9/3/2024  1:51 PM)    Medications  Medications reviewed with patient/caregiver?: Yes (no new meds discussed/reported) (9/3/2024  1:51 PM)  Is the patient having any side effects they believe may be caused by any medication additions or changes?: No (9/3/2024  1:51 PM)  Does the patient have all medications ordered at discharge?: Yes (9/3/2024  1:51 PM)  Care Management Interventions: No intervention needed (9/3/2024  1:51 PM)  Prescription Comments: see med list (9/3/2024  1:51 PM)  Is the patient taking all medications as directed (includes completed medication regime)?: Yes (9/3/2024  1:51 PM)  Care Management Interventions: Provided patient education (9/3/2024  1:51 PM)    Appointments  Does the patient have a primary care provider?: Yes (9/3/2024  1:51 PM)  Care Management Interventions: Educated patient on importance of making appointment; Advised patient to make appointment (9/3/2024  1:51 PM)  Has the patient kept scheduled appointments due by today?: Yes (9/3/2024  1:51 PM)  Care Management Interventions: Advised to schedule with specialist (9/3/2024  1:51 PM)  Follow Up Tasks: Appointments (9/3/2024  1:51 PM)    Self Management  What is the home health agency?: [facility] requested fo PT/OT-PCP notified (9/3/2024  1:51 PM)    Patient Teaching  Does the patient have access to their discharge instructions?: Yes (9/3/2024  1:51 PM)  Care Management Interventions: Reviewed instructions with patient (9/3/2024  1:51 PM)  What is the patient's  "perception of their health status since discharge?: Improving (9/3/2024  1:51 PM)  Is the patient/caregiver able to teach back the hierarchy of who to call/visit for symptoms/problems? PCP, Specialist, Home Health nurse, Urgent Care, ED, 911: Yes (9/3/2024  1:51 PM)  Patient/Caregiver Education Comments: Successful transition of care outreach with patient. Patient reports doing well at home since discharge. No new meds/changes noted with patient during outreach. Patient denies CP/SOB. States she was having trouble with scheduling her appts for her referrals she got at last appt with PCP. Discussed in length with patient different providers and gave contact information to patient for doctors she could schedule with. Patient requested [facility] for PT/OT. PCP notified. Patient did states she was feeling better and states she believes she knows what the issue was now as they recently moved into a new house. States\"there was no filter of furnace and air conditioner. We bought air purifiers and a new filter and now I am breathing so much better today.\" States she got some more electrolytes in her and “I’m really turning a corner now”. Concerned epilepsy is getting worse since coming off pain management and was not happy with the pain management doctor. Provided a doctor name and contact information who specializes more in Emmanuel danlos. Patient denies further discharge questions/concerns/needs at time of outreach call. Emphasized that follow up appts are needed after discharge with PCP and reviewed needed follow ups with any specialties to assess response to treatment from hospitalization. Patient aware of my availability for non-emergent concerns. Contact information provided to the patient. (9/3/2024  1:51 PM)        "

## 2024-09-04 LAB
BACTERIA BLD CULT: NORMAL
BACTERIA BLD CULT: NORMAL

## 2024-09-16 ENCOUNTER — APPOINTMENT (OUTPATIENT)
Dept: CARDIOLOGY | Facility: HOSPITAL | Age: 39
End: 2024-09-16
Payer: COMMERCIAL

## 2024-09-17 ENCOUNTER — PATIENT OUTREACH (OUTPATIENT)
Dept: PRIMARY CARE | Facility: CLINIC | Age: 39
End: 2024-09-17
Payer: COMMERCIAL

## 2024-09-17 NOTE — PROGRESS NOTES
Call regarding after hospitalization.  At time of outreach call the patient feels as if their condition has (improved) since last visit. States that she does not have any current questions or concerns at time of outreach call and is feeling well.

## 2024-09-19 DIAGNOSIS — G89.29 INSOMNIA SECONDARY TO CHRONIC PAIN: ICD-10-CM

## 2024-09-19 DIAGNOSIS — F51.04 CHRONIC INSOMNIA: ICD-10-CM

## 2024-09-19 DIAGNOSIS — G47.01 INSOMNIA SECONDARY TO CHRONIC PAIN: ICD-10-CM

## 2024-09-22 RX ORDER — ESZOPICLONE 3 MG/1
3 TABLET, FILM COATED ORAL NIGHTLY
Qty: 30 TABLET | Refills: 2 | OUTPATIENT
Start: 2024-09-22

## 2024-10-22 ENCOUNTER — PATIENT OUTREACH (OUTPATIENT)
Dept: PRIMARY CARE | Facility: CLINIC | Age: 39
End: 2024-10-22
Payer: COMMERCIAL

## 2024-11-12 ENCOUNTER — APPOINTMENT (OUTPATIENT)
Dept: GASTROENTEROLOGY | Facility: HOSPITAL | Age: 39
End: 2024-11-12
Payer: COMMERCIAL

## 2024-11-15 ENCOUNTER — TELEMEDICINE (OUTPATIENT)
Dept: PRIMARY CARE | Facility: CLINIC | Age: 39
End: 2024-11-15
Payer: COMMERCIAL

## 2024-11-15 DIAGNOSIS — J20.9 ACUTE BRONCHITIS, UNSPECIFIED ORGANISM: Primary | ICD-10-CM

## 2024-11-15 PROCEDURE — 1036F TOBACCO NON-USER: CPT | Performed by: STUDENT IN AN ORGANIZED HEALTH CARE EDUCATION/TRAINING PROGRAM

## 2024-11-15 PROCEDURE — 99214 OFFICE O/P EST MOD 30 MIN: CPT | Performed by: STUDENT IN AN ORGANIZED HEALTH CARE EDUCATION/TRAINING PROGRAM

## 2024-11-15 RX ORDER — DOXYCYCLINE 100 MG/1
100 CAPSULE ORAL 2 TIMES DAILY
Qty: 20 CAPSULE | Refills: 0 | Status: SHIPPED | OUTPATIENT
Start: 2024-11-15 | End: 2024-11-25

## 2024-11-15 ASSESSMENT — ENCOUNTER SYMPTOMS
FEVER: 1
SWEATS: 0
WEIGHT LOSS: 0
COUGH: 1
RHINORRHEA: 1
MYALGIAS: 1
WHEEZING: 0
SORE THROAT: 1
SHORTNESS OF BREATH: 0
HEARTBURN: 0
HEMOPTYSIS: 0
HEADACHES: 1
CHILLS: 0

## 2024-11-15 NOTE — PROGRESS NOTES
Subjective   Patient ID: Lois Fisher is a 39 y.o. female who presents for Follow-up (Fever and possible sinus infection. ).        Cough  This is a new problem. The current episode started in the past 7 days. The problem has been waxing and waning. The problem occurs hourly. The cough is Productive of sputum and productive of purulent sputum. Associated symptoms include ear congestion, a fever, headaches, myalgias, nasal congestion, postnasal drip, rhinorrhea and a sore throat. Pertinent negatives include no chest pain, chills, ear pain, heartburn, hemoptysis, rash, shortness of breath, sweats, weight loss or wheezing. The symptoms are aggravated by lying down.                   Visit Vitals  Smoking Status Never      No LMP recorded.   Current Outpatient Medications   Medication Instructions    brivaracetam (BRIVIACT) 100 mg, oral, 2 times daily    cetirizine (ZYRTEC) 10 mg, oral, 2 times daily    cloNIDine (CATAPRES) 0.1 mg, oral, 2 times daily    diphenhydrAMINE (BENADRYL ALLERGY) 25 mg, Nightly PRN    eszopiclone (LUNESTA) 3 mg, oral, Nightly, Take immediately before bedtime    hydrOXYzine HCL (ATARAX) 25 mg, Every 6 hours PRN    levothyroxine (SYNTHROID, LEVOXYL) 75 mcg, oral, Daily, Take on an empty stomach at the same time each day, either 30 to 60 minutes prior to breakfast    melatonin 10 mg capsule Take by mouth.    methocarbamol (ROBAXIN) 750 mg, 4 times daily    ondansetron ODT (ZOFRAN-ODT) 8 mg, oral, Every 8 hours PRN    pantoprazole (PROTONIX) 40 mg, oral, Daily before breakfast, Do not crush, chew, or split.    PARoxetine (PAXIL) 30 mg, oral, Every morning    rosuvastatin (CRESTOR) 20 mg, oral, Daily    theophylline ER (VANDANA-24) 400 mg, Daily      Social History     Tobacco Use    Smoking status: Never    Smokeless tobacco: Never   Substance Use Topics    Alcohol use: Yes     Comment: Occasionally        Review of Systems   Constitutional:  Positive for fever. Negative for chills and weight loss.    HENT:  Positive for postnasal drip, rhinorrhea and sore throat. Negative for ear pain.    Respiratory:  Positive for cough. Negative for hemoptysis, shortness of breath and wheezing.    Cardiovascular:  Negative for chest pain.   Gastrointestinal:  Negative for heartburn.   Musculoskeletal:  Positive for myalgias.   Skin:  Negative for rash.   Neurological:  Positive for headaches.       All other systems have been reviewed and are negative for complaint       Physical Exam  Limited physical due to the virtual nature of this visit ( real time audio- visual )  Constitutional : Alert, in no distress     Pulm : Normal work of breathing   CNS : Moves all extremities symmetrically   Psych:  A , O X 3 normal mood and effect, speaks coherently     Assessment/Plan   Diagnoses and all orders for this visit:  Acute bronchitis, unspecified organism  -     doxycycline (Vibramycin) 100 mg capsule; Take 1 capsule (100 mg) by mouth 2 times a day for 10 days. Take with at least 8 ounces (large glass) of water, do not lie down for 30 minutes after    Acute bronchitis vs CAP   Rx as above  ( allergic to PCN )                   Conditions addressed and mgmt as noted above.  Pertinent labs, images/ imaging reports , chart review was done .   Age appropriate labs / labs for mgmt of chronic medical conditions ordered, further mgmt pending the results.       This note is intended for the physician writing it, as well as to communicate findings to other healthcare professionals. These notes use medical lexicon that may be misunderstood by non medical persons. Therefore, interpretations of medical notes and terminology should be approached with caution.

## 2024-11-25 ENCOUNTER — TELEPHONE (OUTPATIENT)
Dept: PRIMARY CARE | Facility: CLINIC | Age: 39
End: 2024-11-25
Payer: COMMERCIAL

## 2024-11-25 DIAGNOSIS — G47.01 INSOMNIA SECONDARY TO CHRONIC PAIN: ICD-10-CM

## 2024-11-25 DIAGNOSIS — F51.04 CHRONIC INSOMNIA: ICD-10-CM

## 2024-11-25 DIAGNOSIS — G89.29 INSOMNIA SECONDARY TO CHRONIC PAIN: ICD-10-CM

## 2024-11-26 DIAGNOSIS — F51.04 CHRONIC INSOMNIA: Primary | ICD-10-CM

## 2024-11-26 RX ORDER — ESZOPICLONE 3 MG/1
3 TABLET, FILM COATED ORAL NIGHTLY
Qty: 30 TABLET | Refills: 1 | Status: SHIPPED | OUTPATIENT
Start: 2024-11-26

## 2024-11-29 ENCOUNTER — PATIENT OUTREACH (OUTPATIENT)
Dept: PRIMARY CARE | Facility: CLINIC | Age: 39
End: 2024-11-29
Payer: COMMERCIAL

## 2024-12-03 ENCOUNTER — E-VISIT (OUTPATIENT)
Dept: PRIMARY CARE | Facility: CLINIC | Age: 39
End: 2024-12-03
Payer: COMMERCIAL

## 2024-12-03 DIAGNOSIS — R30.0 DYSURIA: Primary | ICD-10-CM

## 2024-12-03 RX ORDER — NITROFURANTOIN 25; 75 MG/1; MG/1
100 CAPSULE ORAL 2 TIMES DAILY
Qty: 10 CAPSULE | Refills: 0 | Status: SHIPPED | OUTPATIENT
Start: 2024-12-03 | End: 2024-12-08

## 2024-12-03 NOTE — TELEPHONE ENCOUNTER
Chief Complaint: urinary symptoms    HPI:  Dysuria?Yes  Frequency? Yes  Urgency? Yes  Blood in urine? No    Fever, chills, body aches? No  N,V,Diarrhea? No    Abnormal vaginal bleeding? No  Abnormal vaginal pain? No  Abnormal vaginal discharge No    Flank pain? No  Abdominal pain?  No    H/o kidney stones?No  H/o kidney infection? No    All other ROS (-)    Medications:   Morning:   Synthyroid - 75mcg -  Theophylline - 400mg -  Paxil - 30mg -  Zyrtec - 10mg   Briviact - 100mg -  Zantac - 75mg     Night:   Lunesta - 3mg -  Pantoprazole - 40mg -  Rosuvastatin - 20mg -  Melatonin - 10mg -  Zyrtec - 10mg -  Zantac - 75mg   Briviact - 100mg -  Probiotic and multivitamin     As needed:   Hydroxizine - 25-100mg day -  Epi-pen/Auvi-Q - 0.3 mg -  Zofran - 8mg -  Ventoline inhaler   Clonidine - 0.1mg -  Robaxin - 1000mg -  * All doses are all approximate and subject to change.     I have a pacemaker! * An Evia [employer].     Son is Haider Fisher. Born 09/24/18. He has ADHD/allergies (latex)/asthma, level 1 autism.     Allergies:   Toradol (Phlebitis)   Sulfa Drugs (Rash)   Ibuprofen (Hives)   Adhesives (Hives)   Latex/latex fruits (Hives)   Prochlorperazine(Akathisia)   Vancomycin (Red Man)   Quinolones (Severe contraindication)   CAT scan dye (Anaphylaxis)   Honey (Anaphylaxis)     Diagnoses:   Emmanuel-Danlos Syndrome   Dysautonomia/POTS   Prinzmetal’s Angina   Hypothyroidism   Focal Epilepsy   Gastroparesis   Familial High Cholesterol   Ulcers/Chronic Gastritis   Anxiety/Depression/PTSD   Post-concussive Syndrome   Idiopathic Hypoglycemia   Mast Cell (MCAS) Issues    Dysuria  Check urine culture  No  Take antibiotic as directed  Rest and drink plenty of fluids  Follow up If no improvement or if symptoms worsen in 48 hours OR if symptoms persist after completing the antibiotics OR if you develop fevers, severe back or abdominal pain or any other concerning symptoms.     Thank you for the medical document!  Definitely sounds  like a urinary tract infection.   I will send in a prescription for macrobid (nitrofurantion) to your pharmacy.  Take 1 by mouth 2x a day for 5 days.  Drink plenty of fluids  Follow up If no improvement or if symptoms worsen in 48 hours OR if symptoms persist after completing the antibiotics OR if you develop fevers, severe back or abdominal pain or any other concerning symptoms.

## 2024-12-09 DIAGNOSIS — G40.109 FOCAL EPILEPSY (MULTI): ICD-10-CM

## 2024-12-30 ENCOUNTER — HOSPITAL ENCOUNTER (EMERGENCY)
Facility: HOSPITAL | Age: 39
Discharge: HOME | End: 2024-12-31
Attending: EMERGENCY MEDICINE
Payer: COMMERCIAL

## 2024-12-30 ENCOUNTER — OFFICE VISIT (OUTPATIENT)
Dept: URGENT CARE | Age: 39
End: 2024-12-30
Payer: COMMERCIAL

## 2024-12-30 VITALS
HEART RATE: 86 BPM | RESPIRATION RATE: 16 BRPM | BODY MASS INDEX: 31.99 KG/M2 | OXYGEN SATURATION: 98 % | WEIGHT: 192 LBS | HEIGHT: 65 IN | TEMPERATURE: 98.3 F | DIASTOLIC BLOOD PRESSURE: 87 MMHG | SYSTOLIC BLOOD PRESSURE: 127 MMHG

## 2024-12-30 VITALS
WEIGHT: 195 LBS | BODY MASS INDEX: 32.49 KG/M2 | SYSTOLIC BLOOD PRESSURE: 163 MMHG | HEART RATE: 86 BPM | OXYGEN SATURATION: 98 % | TEMPERATURE: 97.8 F | DIASTOLIC BLOOD PRESSURE: 99 MMHG | RESPIRATION RATE: 16 BRPM | HEIGHT: 65 IN

## 2024-12-30 DIAGNOSIS — R10.9 FLANK PAIN: ICD-10-CM

## 2024-12-30 DIAGNOSIS — R39.9 UTI SYMPTOMS: Primary | ICD-10-CM

## 2024-12-30 DIAGNOSIS — N39.0 ACUTE LOWER UTI: Primary | ICD-10-CM

## 2024-12-30 DIAGNOSIS — N93.9 VAGINAL SPOTTING: ICD-10-CM

## 2024-12-30 LAB
HCG UR QL IA.RAPID: NEGATIVE
POC APPEARANCE, URINE: ABNORMAL
POC BILIRUBIN, URINE: NEGATIVE
POC BLOOD, URINE: ABNORMAL
POC COLOR, URINE: YELLOW
POC GLUCOSE, URINE: NEGATIVE MG/DL
POC KETONES, URINE: NEGATIVE MG/DL
POC LEUKOCYTES, URINE: ABNORMAL
POC NITRITE,URINE: NEGATIVE
POC PH, URINE: 6 PH
POC PROTEIN, URINE: NEGATIVE MG/DL
POC SPECIFIC GRAVITY, URINE: 1.01
POC UROBILINOGEN, URINE: 0.2 EU/DL
PREGNANCY TEST URINE, POC: NEGATIVE

## 2024-12-30 PROCEDURE — 81003 URINALYSIS AUTO W/O SCOPE: CPT | Performed by: PHYSICIAN ASSISTANT

## 2024-12-30 PROCEDURE — 81025 URINE PREGNANCY TEST: CPT | Performed by: EMERGENCY MEDICINE

## 2024-12-30 PROCEDURE — 3008F BODY MASS INDEX DOCD: CPT | Performed by: PHYSICIAN ASSISTANT

## 2024-12-30 PROCEDURE — 99283 EMERGENCY DEPT VISIT LOW MDM: CPT | Performed by: EMERGENCY MEDICINE

## 2024-12-30 PROCEDURE — 99214 OFFICE O/P EST MOD 30 MIN: CPT | Performed by: PHYSICIAN ASSISTANT

## 2024-12-30 PROCEDURE — 87086 URINE CULTURE/COLONY COUNT: CPT | Mod: AHULAB | Performed by: EMERGENCY MEDICINE

## 2024-12-30 PROCEDURE — 81025 URINE PREGNANCY TEST: CPT | Performed by: PHYSICIAN ASSISTANT

## 2024-12-30 ASSESSMENT — COLUMBIA-SUICIDE SEVERITY RATING SCALE - C-SSRS
2. HAVE YOU ACTUALLY HAD ANY THOUGHTS OF KILLING YOURSELF?: NO
1. IN THE PAST MONTH, HAVE YOU WISHED YOU WERE DEAD OR WISHED YOU COULD GO TO SLEEP AND NOT WAKE UP?: NO
6. HAVE YOU EVER DONE ANYTHING, STARTED TO DO ANYTHING, OR PREPARED TO DO ANYTHING TO END YOUR LIFE?: NO

## 2024-12-30 ASSESSMENT — PAIN DESCRIPTION - ORIENTATION: ORIENTATION: LOWER

## 2024-12-30 ASSESSMENT — PAIN DESCRIPTION - LOCATION: LOCATION: BACK

## 2024-12-30 ASSESSMENT — PAIN - FUNCTIONAL ASSESSMENT: PAIN_FUNCTIONAL_ASSESSMENT: 0-10

## 2024-12-30 ASSESSMENT — PAIN SCALES - GENERAL
PAINLEVEL_OUTOF10: 5 - MODERATE PAIN
PAINLEVEL_OUTOF10: 4

## 2024-12-30 NOTE — ED PROVIDER NOTES
HPI   Chief Complaint   Patient presents with   • Female Dysuria       HPI: []  39-year-old white female history of POTS syndrome, Erler Danlos syndrome, anxiety, depression, gastroparesis, dyslipidemia thyroid disease recurrent UTIs comes in with the urinary urgency frequency for the last 24 hours went to urgent care, dipstick shows UTI and given her multitude of allergies and the fact she had multiple antibiotics recently she was sent to the ED for evaluation.  She had no flank pain no fever no chills.  No nausea no vomiting no diarrhea.  No hematuria.  No lethargy no known no night sweats.    Past history: POTS syndrome, Emmanuel-Danlos syndrome, anxiety, depression, dyslipidemia, gastroparesis, recurrent UTIs  Social: Pat denies current tobacco alcohol drug abuse.  REVIEW OF SYSTEMS:    GENERAL.: No weight loss, fatigue, anorexia, insomnia, fever.    EYES: No vision loss, double vision, drainage, eye pain.    ENT: No pharyngitis, dry mouth.    CARDIOPULMONARY: No chest pain, palpitations, syncope, near syncope. No shortness of breath, cough, hemoptysis.    GI: No abdominal pain, change in bowel habits, melena, hematemesis, hematochezia, nausea, vomiting, diarrhea.    : No discharge, positive for dysuria, positive frequency, positive for urgency, hematuria.    MS: No limb pain, joint pain, joint swelling.    SKIN: No rashes.    PSYCH: No depression, anxiety, suicidality, homicidality.    Review of systems is otherwise negative unless stated above or in history of present illness.  Social history, family history, allergies reviewed.  PHYSICAL EXAM:    GENERAL: Vitals noted, no distress. Alert and oriented  x 3. Non-toxic.      EENT: TMs clear. Posterior oropharynx unremarkable. No meningismus. No LAD.     NECK: Supple. Nontender. No midline tenderness.     CARDIAC: Regular, rate, rhythm. No murmurs rubs or gallops. No JVD    PULMONARY: Lungs clear bilaterally with good aeration. No wheezes rales or rhonchi. No  respiratory distress.     ABDOMEN: Soft, nonsurgical. Nontender. No peritoneal signs. Normoactive bowel sounds. No pulsatile masses.  Negative CVA tenderness.    EXTREMITIES: No peripheral edema. Negative Homans bilaterally, no cords.  2+ bounding possible perfused    SKIN: No rash. Intact.     NEURO: No focal neurologic deficits, NIH score of 0. Cranial nerves normal as tested from II through XII.     MEDICAL DECISION MAKING:    Urinalysis been obtained    ED course: Awaiting UA results.    Impression: Acute UTI  Potential MDM: 39-year-old female history of recurrent UTIs and multitude of other medical problems with multitude of allergies comes in with recurrent UTIs recently on antibiotics multiple times in the last month currently she is stable hemodynamic.  My suspicion for bacterial infection pyelonephritis bacteremia is low.  We will await for urinalysis results we will review her previous culture results and we will treat her appropriately and make a decision about inpatient which outpatient management based on her urinalysis results and previous culture results.              Patient History   Past Medical History:   Diagnosis Date   • Anxiety and depression    • Dysautonomia (Multi)    • Emmanuel-Danlos disease (HHS-HCC)    • Focal epilepsy (Multi)    • Gastric ulcer, chronic    • Gastroparesis    • Hyperlipemia    • Hypothyroidism    • Idiopathic postprandial hypoglycemia    • Post concussion syndrome    • Prinzmetal angina (CMS-HCC)      Past Surgical History:   Procedure Laterality Date   • APPENDECTOMY     • BILE DUCT STENT PLACEMENT     • BILIARY DRAINAGE CATHETER PLACEMENT W/ BILE DUCT TUBE CHANGE     •  SECTION, LOW TRANSVERSE     • CHOLECYSTECTOMY     • PACEMAKER PLACEMENT       Family History   Adopted: Yes   Problem Relation Name Age of Onset   • Hyperlipidemia Mother     • Lead poisoning Father     • Colon cancer Father     • Heart attack Father     • Drug abuse Sister     • Heart attack  Brother     • Drug abuse Brother     • Autism Son     • Asthma Son     • Allergies Son     • Club foot Son     • Other (Heart bypass) Mother's Sister     • Heart attack Maternal Grandfather     • Heart attack Paternal Grandfather       Social History     Tobacco Use   • Smoking status: Never   • Smokeless tobacco: Never   Substance Use Topics   • Alcohol use: Yes     Comment: Occasionally   • Drug use: Yes     Types: Marijuana       Physical Exam   ED Triage Vitals [12/30/24 1325]   Temperature Heart Rate Respirations BP   36.5 °C (97.7 °F) 96 16 139/87      Pulse Ox Temp Source Heart Rate Source Patient Position   98 % Temporal -- --      BP Location FiO2 (%)     -- --       Physical Exam      ED Course & MDM   Diagnoses as of 12/30/24 1559   Acute lower UTI                 No data recorded     Celia Coma Scale Score: 15 (12/30/24 1546 : Ema Alexander, RN)                           Medical Decision Making      Procedure  Procedures     Sd Seo MD  12/30/24 7871

## 2024-12-30 NOTE — ED TRIAGE NOTES
Pt presents to the ED from  with c/o UTI. Pt was just at  in [place] and has a confirmed UTI, however she has had several since November and UC is considered she is becoming resistant to the antibx. Pt has hx of MRSA. Endorsing burning and itching with urination.

## 2024-12-30 NOTE — PROGRESS NOTES
Subjective   Patient ID: Lois Fisher is a 39 y.o. female. They present today with a chief complaint of Urinary Problem (X 1 days burning and discomfort. States compromised immune system.).    History of Present Illness  HPI  Pt presents with a complex hx of Ehler's Danlos, MRSA infection, uterine ablation with chronic pelvic pain and recurrent UTIs with new UTI symptoms over the last day. Endorses frequency, urgency, dysuria, and new left flank pain. No fever, NVD. Intolerant to many abx. Last treated with Macrobid 12/3. Did have improvement but symptoms have returned. Also endorses some vaginal spotting.     Past Medical History  Allergies as of 12/30/2024 - Reviewed 12/30/2024   Allergen Reaction Noted    Gallium Anaphylaxis 09/27/2021    Honey Anaphylaxis 07/22/2024    Ibuprofen Itching, Hives, and Unknown 12/19/2014    Iodinated contrast media Anaphylaxis and Unknown 10/16/2017    Ketorolac Itching, Hives, Unknown, Other, and Swelling 01/19/2019    Prochlorperazine Hallucinations, Unknown, and Other 11/24/2019    Quinolones Unknown and Other 10/16/2017    Vancomycin analogues Other, Rash, and Unknown 08/27/2004    Sulfasalazine (bulk) Rash 03/22/2020    Adhesive tape-silicones Itching 07/03/2024    Latex Itching 07/03/2024    Neosporin (emo-mpn-okdhp) [neomycin-bacitracnzn-polymyxnb] Unknown 07/03/2024    Penicillin Nausea/vomiting 07/03/2024    Penicillins Nausea And Vomiting, Unknown, and Nausea/vomiting 12/19/2014    Prochlorperazine maleate(bulk) Other 03/22/2020    Tropical fruit flavor Unknown 07/22/2024    Vancomycin Nausea/vomiting 07/03/2024    Sulfa (sulfonamide antibiotics) Rash and Hives 03/13/2023       (Not in a hospital admission)             Past Medical History:   Diagnosis Date    Anxiety and depression     Dysautonomia (Multi)     Emmanuel-Danlos disease (HHS-HCC)     Focal epilepsy (Multi)     Gastric ulcer, chronic     Gastroparesis     Hyperlipemia     Hypothyroidism     Idiopathic  "postprandial hypoglycemia     Post concussion syndrome     Prinzmetal angina (CMS-HCC)        Past Surgical History:   Procedure Laterality Date    APPENDECTOMY      BILE DUCT STENT PLACEMENT      BILIARY DRAINAGE CATHETER PLACEMENT W/ BILE DUCT TUBE CHANGE       SECTION, LOW TRANSVERSE      CHOLECYSTECTOMY      PACEMAKER PLACEMENT          reports that she has never smoked. She has never used smokeless tobacco. She reports current alcohol use. She reports current drug use. Drug: Marijuana.    Review of Systems  Review of Systems   Review of systems: A complete review of systems was done, and is as stated in the history of present illness, is otherwise negative or not pertinent to the complaint.       Objective    Vitals:    24 1159   BP: 127/87   Pulse: 86   Resp: 16   Temp: 36.8 °C (98.3 °F)   TempSrc: Oral   SpO2: 98%   Weight: 87.1 kg (192 lb)   Height: 1.651 m (5' 5\")     No LMP recorded. Patient has had an ablation.    Physical Exam  NAD. Well appearing    MMM   PERRLA   no icterus   TMs clear bilat   Oropharynx clear of exudate or tonsillar swelling/exudate   neck supple. YONIS. No LAD   no resp distress. Lungs CTAB without w/r/r   RRR. No m/r/g   Abd; Soft. NTTP   + L CVA TTP   KAUFMAN x 4. MS 5/5   Skin; warm without rashes   pulses 2+ throughout   neuro intact with normal sensation and motor   psych a&o x 3       Procedures    Point of Care Test & Imaging Results from this visit  Results for orders placed or performed in visit on 24   POCT UA Automated manually resulted    Collection Time: 24 12:14 PM   Result Value Ref Range    POC Color, Urine Yellow Straw, Yellow, Light-Yellow    POC Appearance, Urine Cloudy (A) Clear    POC Glucose, Urine NEGATIVE NEGATIVE mg/dl    POC Bilirubin, Urine NEGATIVE NEGATIVE    POC Ketones, Urine NEGATIVE NEGATIVE mg/dl    POC Specific Gravity, Urine 1.010 1.005 - 1.035    POC Blood, Urine SMALL (1+) (A) NEGATIVE    POC PH, Urine 6.0 No Reference Range " Established PH    POC Protein, Urine NEGATIVE NEGATIVE mg/dl    POC Urobilinogen, Urine 0.2 0.2, 1.0 EU/DL    Poc Nitrite, Urine NEGATIVE NEGATIVE    POC Leukocytes, Urine LARGE (3+) (A) NEGATIVE   POCT pregnancy, urine manually resulted    Collection Time: 12/30/24 12:14 PM   Result Value Ref Range    Preg Test, Ur Negative Negative       Assessment/Plan   Allergies, medications, history, and pertinent labs/EKGs/Imaging reviewed by Pino Jefferson PA-C.     Medical Decision Making  -Discussed with Dr. Monreal. Recommend ED for further evaluation and management considering comorbidity, concern for concurrent pelvic infection vs pyelo, recurrent UTIs that could indicate renal pathology/obstructive uropathy. May benefit from imaging. Also considering treatment options are limited in the urgent care setting given her intolerances and allergies to abx makes further treatment failure a concern. May benefit from a urology consultation. Fully discussed with all ?s answered     Orders and Diagnoses  Diagnoses and all orders for this visit:  UTI symptoms  -     POCT UA Automated manually resulted  -     POCT pregnancy, urine manually resulted  Flank pain  Vaginal spotting

## 2024-12-31 LAB
APPEARANCE UR: ABNORMAL
BACTERIA UR CULT: NORMAL
BILIRUB UR STRIP.AUTO-MCNC: NEGATIVE MG/DL
COLOR UR: YELLOW
GLUCOSE UR STRIP.AUTO-MCNC: NORMAL MG/DL
HOLD SPECIMEN: NORMAL
KETONES UR STRIP.AUTO-MCNC: ABNORMAL MG/DL
LEUKOCYTE ESTERASE UR QL STRIP.AUTO: ABNORMAL
MUCOUS THREADS #/AREA URNS AUTO: ABNORMAL /LPF
NITRITE UR QL STRIP.AUTO: NEGATIVE
PH UR STRIP.AUTO: 6 [PH]
PROT UR STRIP.AUTO-MCNC: ABNORMAL MG/DL
RBC # UR STRIP.AUTO: NEGATIVE /UL
RBC #/AREA URNS AUTO: >20 /HPF
SP GR UR STRIP.AUTO: 1.03
SQUAMOUS #/AREA URNS AUTO: ABNORMAL /HPF
UROBILINOGEN UR STRIP.AUTO-MCNC: NORMAL MG/DL
WBC #/AREA URNS AUTO: >50 /HPF

## 2024-12-31 PROCEDURE — 87086 URINE CULTURE/COLONY COUNT: CPT | Mod: AHULAB | Performed by: EMERGENCY MEDICINE

## 2024-12-31 PROCEDURE — 2500000002 HC RX 250 W HCPCS SELF ADMINISTERED DRUGS (ALT 637 FOR MEDICARE OP, ALT 636 FOR OP/ED): Performed by: EMERGENCY MEDICINE

## 2024-12-31 PROCEDURE — 81001 URINALYSIS AUTO W/SCOPE: CPT | Performed by: EMERGENCY MEDICINE

## 2024-12-31 RX ORDER — NITROFURANTOIN 25; 75 MG/1; MG/1
100 CAPSULE ORAL 2 TIMES DAILY
Qty: 14 CAPSULE | Refills: 0 | Status: SHIPPED | OUTPATIENT
Start: 2024-12-31 | End: 2025-01-07

## 2024-12-31 RX ORDER — NITROFURANTOIN 25; 75 MG/1; MG/1
100 CAPSULE ORAL ONCE
Status: COMPLETED | OUTPATIENT
Start: 2024-12-31 | End: 2024-12-31

## 2024-12-31 RX ADMIN — NITROFURANTOIN MONOHYDRATE/MACROCRYSTALS 100 MG: 75; 25 CAPSULE ORAL at 01:21

## 2024-12-31 NOTE — PROGRESS NOTES
Patient was seen and evaluated by Dr. Seo due to concern for urinary tract infection.  She was sent by urgent care after she had a positive dipstick.  Patient was brought back to the emergency room pending urinalysis.  Per my discussion with patient she has been experiencing frequency for 24 hours and feels like she had recurrence of her UTI as her urine has been cloudy and foul-smelling.  Denies any other symptoms at this time.  On evaluation she is very well-appearing and in no acute distress.  Lungs are clear and heart is regular.  Abdomen is soft nontender nondistended.  No significant CVA tenderness.  Vital signs are stable and without signs concerning for sepsis.  Patient states that she has been recently diagnosed with UTI, however, this has been on telehealth evaluation without urinalysis provided.  View of records did not reveal any recent positive urine cultures.  Given patient multiple allergies and recent antibiosis same will start with Macrobid as she did feel like this improved her symptoms before.  Will send the urine for culture and antibiotics will be adjusted according to results.  Patient is very much in agreement with this plan and feels well enough to go home.  She is discharged after first dose of medication is given here.  She is thoroughly educated on supportive care at home as well as signs and symptoms that should prompt immediate turn to emergency room.  She is given a referral to urology as well for recurrent UTI.      Dayana Wu MD

## 2024-12-31 NOTE — DISCHARGE INSTRUCTIONS
Please take medications as indicated for length of time instructed until completed.  Schedule follow-up with urology for recurrent UTIs.  Follow-up closely with your primary care physician.

## 2025-01-01 LAB — BACTERIA UR CULT: NORMAL

## 2025-01-02 ENCOUNTER — OFFICE VISIT (OUTPATIENT)
Dept: URGENT CARE | Age: 40
End: 2025-01-02
Payer: COMMERCIAL

## 2025-01-02 VITALS
OXYGEN SATURATION: 98 % | DIASTOLIC BLOOD PRESSURE: 65 MMHG | HEART RATE: 83 BPM | TEMPERATURE: 99.5 F | SYSTOLIC BLOOD PRESSURE: 109 MMHG

## 2025-01-02 DIAGNOSIS — N30.01 ACUTE CYSTITIS WITH HEMATURIA: Primary | ICD-10-CM

## 2025-01-02 DIAGNOSIS — N89.8 VAGINAL DISCHARGE: ICD-10-CM

## 2025-01-02 LAB
POC APPEARANCE, URINE: ABNORMAL
POC BACTERIAL VAGINITIS (RAPID): NEGATIVE
POC BILIRUBIN, URINE: NEGATIVE
POC BLOOD, URINE: ABNORMAL
POC COLOR, URINE: YELLOW
POC GLUCOSE, URINE: NEGATIVE MG/DL
POC KETONES, URINE: NEGATIVE MG/DL
POC LEUKOCYTES, URINE: ABNORMAL
POC NITRITE,URINE: NEGATIVE
POC PH, URINE: 6 PH
POC PROTEIN, URINE: ABNORMAL MG/DL
POC SPECIFIC GRAVITY, URINE: 1.01
POC UROBILINOGEN, URINE: 0.2 EU/DL
PREGNANCY TEST URINE, POC: NEGATIVE

## 2025-01-02 PROCEDURE — 87086 URINE CULTURE/COLONY COUNT: CPT

## 2025-01-02 ASSESSMENT — ENCOUNTER SYMPTOMS
DYSURIA: 1
FREQUENCY: 1

## 2025-01-02 NOTE — PROGRESS NOTES
Subjective   Patient ID: Lois Fisher is a 39 y.o. female. They present today with a chief complaint of UTI (Worsening UTI , vaginal discharge and bladder pain x 1 day, pt. Has had UTI for a few days but believes its getting worse. ).    History of Present Illness  Patient is a 39-year-old female with extensive past medical history including Emmanuel-Danlos disease, hyperlipidemia, hypothyroidism, dystonia and epilepsy who presents to urgent care today with a complaint of urinary urgency/frequency/burning.  She states she was diagnosed with a urinary tract infection on December 3 of last year and treated with Macrobid.  She notes her symptoms resolved at that time but returned again on 12/30/2024.  She was told due to her allergy history, she required IV antibiotics and was sent to the emergency room.  She was evaluated emergency room and again treated with Macrobid but symptoms have not resolved.  She states her symptoms returned approximately 24 hours ago.  She denies any fevers, chills, nausea, vomiting but does attest to some mild low back pain.  No other complaints or concerns mention this time.      History provided by:  Patient  UTI      Past Medical History  Allergies as of 01/02/2025 - Reviewed 01/02/2025   Allergen Reaction Noted    Gallium Anaphylaxis 09/27/2021    Honey Anaphylaxis 07/22/2024    Ibuprofen Itching, Hives, and Unknown 12/19/2014    Iodinated contrast media Anaphylaxis and Unknown 10/16/2017    Ketorolac Itching, Hives, Unknown, Other, and Swelling 01/19/2019    Prochlorperazine Hallucinations, Unknown, and Other 11/24/2019    Quinolones Unknown and Other 10/16/2017    Vancomycin analogues Other, Rash, and Unknown 08/27/2004    Sulfasalazine (bulk) Rash 03/22/2020    Adhesive tape-silicones Itching 07/03/2024    Latex Itching 07/03/2024    Neosporin (gwt-[id number]-[id number]) [neomycin-bacitracnzn-polymyxnb] Unknown 07/03/2024    Penicillin Nausea/vomiting 07/03/2024    Penicillins Nausea And Vomiting,  Unknown, and Nausea/vomiting 2014    Prochlorperazine maleate(bulk) Other 2020    Tropical fruit flavor Unknown 2024    Vancomycin Nausea/vomiting 2024    Sulfa (sulfonamide antibiotics) Rash and Hives 2023       (Not in a hospital admission)         Past Medical History:   Diagnosis Date    Anxiety and depression     Dysautonomia (Multi)     Emmanuel-Danlos disease ([facility])     Focal epilepsy (Multi)     Gastric ulcer, chronic     Gastroparesis     Hyperlipemia     Hypothyroidism     Idiopathic postprandial hypoglycemia     Post concussion syndrome     Prinzmetal angina (CMS-HCC)        Past Surgical History:   Procedure Laterality Date    APPENDECTOMY      BILE DUCT STENT PLACEMENT      BILIARY DRAINAGE CATHETER PLACEMENT W/ BILE DUCT TUBE CHANGE       SECTION, LOW TRANSVERSE      CHOLECYSTECTOMY      PACEMAKER PLACEMENT          reports that she has never smoked. She has never used smokeless tobacco. She reports current alcohol use. She reports current drug use. Drug: Marijuana.    Review of Systems  Review of Systems   Genitourinary:  Positive for dysuria, frequency and urgency.                                  Objective    Vitals:    25 1213   BP: 109/65   BP Location: Left arm   Patient Position: Sitting   BP Cuff Size: Large adult   Pulse: 83   Temp: 37.5 °C (99.5 °F)   TempSrc: Oral   SpO2: 98%     No LMP recorded. Patient has had an ablation.    Physical Exam  Vitals and nursing note reviewed.   Constitutional:       General: She is not in acute distress.     Appearance: Normal appearance. She is not ill-appearing, toxic-appearing or diaphoretic.   HENT:      Head: Normocephalic and atraumatic.      Mouth/Throat:      Mouth: Mucous membranes are moist.   Eyes:      Extraocular Movements: Extraocular movements intact.      Conjunctiva/sclera: Conjunctivae normal.      Pupils: Pupils are equal, round, and reactive to light.   Cardiovascular:      Rate and Rhythm:  Normal rate and regular rhythm.      Pulses: Normal pulses.      Heart sounds: Normal heart sounds.   Pulmonary:      Effort: Pulmonary effort is normal. No respiratory distress.      Breath sounds: Normal breath sounds. No stridor. No wheezing, rhonchi or rales.   Chest:      Chest wall: No tenderness.   Musculoskeletal:         General: Normal range of motion.      Cervical back: Normal range of motion and neck supple.   Skin:     General: Skin is warm and dry.      Capillary Refill: Capillary refill takes less than 2 seconds.   Neurological:      General: No focal deficit present.      Mental Status: She is alert and oriented to person, place, and time.   Psychiatric:         Mood and Affect: Mood normal.         Behavior: Behavior normal.         Procedures      Assessment/Plan   Allergies, medications, history, and pertinent labs/EKGs/Imaging reviewed by RIMMA Wilson.     Medical Decision Making  Patient presents with ongoing, worsening urinary symptoms.  She has been seen twice for the symptoms in the past and treated with Macrobid which has not been effective.  She has an extensive medical history and medical allergies making treatment with p.o. medications challenging if not impossible.  She is allergic to all penicillins, sulfa, fluoroquinolones and glycopeptides.    Urinalysis positive for infection and hematuria.  Suspect possible developing pyelonephritis based on history and exam.  Unfortunately, patient is unable to take any p.o. meds due to her allergies.  I do not feel Macrobid is appropriate as it is failed twice in the past.  She will likely need IV medications for resolution of symptoms.  She understands and is agreeable to this plan.  She will seek evaluation in the emergency room upon discharge.    Orders and Diagnoses  Diagnoses and all orders for this visit:  Acute cystitis with hematuria  Vaginal discharge  -     POCT BV Blue Rapid - Bacterial Vaginitis manually resulted  -      POCT UA Automated manually resulted  -     POCT pregnancy, urine manually resulted      Medical Admin Record      Follow Up Instructions  No follow-ups on file.    Patient disposition: ED     Electronically signed by RIMMA Wilson  12:55 PM

## 2025-01-02 NOTE — PATIENT INSTRUCTIONS
You were seen at Urgent Care today and diagnosed with a urinary tract infection.  Unfortunately, due to your medical history and medication allergies, I am not able to treat this infection with oral medications.  You will most likely require IV medications which are available at the emergency room.  Please go there upon discharge as your bladder infection may progress to a kidney infection, sepsis or even death if left untreated.

## 2025-01-03 LAB — BACTERIA UR CULT: NORMAL

## 2025-01-21 ENCOUNTER — APPOINTMENT (OUTPATIENT)
Dept: GASTROENTEROLOGY | Facility: HOSPITAL | Age: 40
End: 2025-01-21
Payer: COMMERCIAL

## 2025-01-28 ENCOUNTER — APPOINTMENT (OUTPATIENT)
Dept: BEHAVIORAL HEALTH | Facility: CLINIC | Age: 40
End: 2025-01-28
Payer: COMMERCIAL

## 2025-01-28 DIAGNOSIS — G47.01 INSOMNIA SECONDARY TO CHRONIC PAIN: ICD-10-CM

## 2025-01-28 DIAGNOSIS — F41.1 GAD (GENERALIZED ANXIETY DISORDER): ICD-10-CM

## 2025-01-28 DIAGNOSIS — F43.10 PTSD (POST-TRAUMATIC STRESS DISORDER): ICD-10-CM

## 2025-01-28 DIAGNOSIS — F51.04 CHRONIC INSOMNIA: ICD-10-CM

## 2025-01-28 DIAGNOSIS — G89.29 INSOMNIA SECONDARY TO CHRONIC PAIN: ICD-10-CM

## 2025-01-28 DIAGNOSIS — F41.0 PANIC ATTACK DUE TO POST TRAUMATIC STRESS DISORDER (PTSD): ICD-10-CM

## 2025-01-28 DIAGNOSIS — F43.10 PANIC ATTACK DUE TO POST TRAUMATIC STRESS DISORDER (PTSD): ICD-10-CM

## 2025-01-28 PROCEDURE — 90792 PSYCH DIAG EVAL W/MED SRVCS: CPT | Performed by: STUDENT IN AN ORGANIZED HEALTH CARE EDUCATION/TRAINING PROGRAM

## 2025-01-28 RX ORDER — PAROXETINE HYDROCHLORIDE 40 MG/1
40 TABLET, FILM COATED ORAL EVERY MORNING
Qty: 30 TABLET | Refills: 1 | Status: SHIPPED | OUTPATIENT
Start: 2025-01-28 | End: 2026-01-28

## 2025-01-28 RX ORDER — ESZOPICLONE 3 MG/1
3 TABLET, FILM COATED ORAL NIGHTLY
Qty: 30 TABLET | Refills: 0 | Status: SHIPPED | OUTPATIENT
Start: 2025-01-28
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
## 2025-01-31 ENCOUNTER — OFFICE VISIT (OUTPATIENT)
Dept: URGENT CARE | Age: 40
End: 2025-01-31
Payer: COMMERCIAL

## 2025-01-31 VITALS
SYSTOLIC BLOOD PRESSURE: 136 MMHG | TEMPERATURE: 99.9 F | DIASTOLIC BLOOD PRESSURE: 98 MMHG | OXYGEN SATURATION: 96 % | HEART RATE: 100 BPM

## 2025-01-31 DIAGNOSIS — J02.9 EXUDATIVE PHARYNGITIS: Primary | ICD-10-CM

## 2025-01-31 DIAGNOSIS — J02.9 SORE THROAT: ICD-10-CM

## 2025-01-31 LAB — POC RAPID STREP: NEGATIVE

## 2025-01-31 RX ORDER — AZITHROMYCIN 250 MG/1
250 TABLET, FILM COATED ORAL DAILY
Qty: 6 TABLET | Refills: 0 | Status: SHIPPED | OUTPATIENT
Start: 2025-01-31 | End: 2025-02-06

## 2025-01-31 ASSESSMENT — ENCOUNTER SYMPTOMS
SORE THROAT: 1
CONSTITUTIONAL NEGATIVE: 1

## 2025-01-31 NOTE — PROGRESS NOTES
Subjective   Patient ID: Lois Fisher is a 39 y.o. female. They present today with a chief complaint of Sore Throat (Pt states yesterday morning, white spots, painful swallowing, lymph nodes swollen and headache).    History of Present Illness  39-year-old female who comes in today with a chief complaint of sore throat x 2 days.  She also does have a history of painful swallowing and headaches.  She states that she has been exposed to strep pharyngitis at home.  Patient is concerned because of her history of Euler Danlos syndrome and POTS.  She states that she is usually overtreated because if she is not treated and her symptoms become worse and she is easily hospitalized.      Sore Throat         Past Medical History  Allergies as of 01/31/2025 - Reviewed 01/31/2025   Allergen Reaction Noted    Gallium Anaphylaxis 09/27/2021    Honey Anaphylaxis 07/22/2024    Ibuprofen Itching, Hives, and Unknown 12/19/2014    Iodinated contrast media Anaphylaxis and Unknown 10/16/2017    Ketorolac Itching, Hives, Unknown, Other, and Swelling 01/19/2019    Prochlorperazine Hallucinations, Unknown, and Other 11/24/2019    Quinolones Unknown and Other 10/16/2017    Vancomycin analogues Other, Rash, and Unknown 08/27/2004    Sulfasalazine (bulk) Rash 03/22/2020    Adhesive tape-silicones Itching 07/03/2024    Latex Itching 07/03/2024    Neosporin (yme-mdf-idgtq) [neomycin-bacitracnzn-polymyxnb] Unknown 07/03/2024    Penicillin Nausea/vomiting 07/03/2024    Penicillins Nausea And Vomiting, Unknown, and Nausea/vomiting 12/19/2014    Prochlorperazine maleate(bulk) Other 03/22/2020    Tropical fruit flavor Unknown 07/22/2024    Vancomycin Nausea/vomiting 07/03/2024    Wellbutrin [bupropion hcl] Hallucinations 01/28/2025    Sulfa (sulfonamide antibiotics) Rash and Hives 03/13/2023       (Not in a hospital admission)       Past Medical History:   Diagnosis Date    Anxiety and depression     Dysautonomia (Multi)     Emmanuel-Danlos disease  ([facility]-HCC)     Focal epilepsy (Multi)     Gastric ulcer, chronic     Gastroparesis     Hyperlipemia     Hypothyroidism     Idiopathic postprandial hypoglycemia     Post concussion syndrome     Prinzmetal angina (CMS-HCC)        Past Surgical History:   Procedure Laterality Date    APPENDECTOMY      BILE DUCT STENT PLACEMENT      BILIARY DRAINAGE CATHETER PLACEMENT W/ BILE DUCT TUBE CHANGE       SECTION, LOW TRANSVERSE      CHOLECYSTECTOMY      PACEMAKER PLACEMENT          reports that she has never smoked. She has never used smokeless tobacco. She reports current alcohol use. She reports current drug use. Drug: Marijuana.    Review of Systems  Review of Systems   Constitutional: Negative.    HENT:  Positive for sore throat.                                   Objective    Vitals:    25 1834   BP: (!) 136/98   Pulse: 100   Temp: 37.7 °C (99.9 °F)   SpO2: 96%     No LMP recorded. Patient has had an ablation.    Physical Exam  Vitals and nursing note reviewed.   Constitutional:       Appearance: Normal appearance.   HENT:      Head: Normocephalic and atraumatic.      Right Ear: Tympanic membrane, ear canal and external ear normal.      Left Ear: Tympanic membrane, ear canal and external ear normal.      Mouth/Throat:      Lips: Pink.      Mouth: Mucous membranes are moist.      Pharynx: Uvula midline.      Tonsils: Tonsillar exudate present.      Comments: There is a small amount of exudate noticed on the right tonsil.  Both tonsils are noticed to be erythematous and slightly enlarged.  Neurological:      Mental Status: She is alert.         Procedures    Point of Care Test & Imaging Results from this visit  Results for orders placed or performed in visit on 25   POCT rapid strep A manually resulted   Result Value Ref Range    POC Rapid Strep Negative Negative      No results found.    Diagnostic study results (if any) were reviewed by Roberth Penny PA-C.    Assessment/Plan   Allergies, medications,  history, and pertinent labs/EKGs/Imaging reviewed by Roberth Penny PA-C.     Medical Decision Making  39-year-old female who comes in today with a chief complaint of sore throat since yesterday with exposure to strep pharyngitis.  Patient does have exudate noted on the right side.  Based on the patient's extensive history, as well as allergies to medication.  Patient will be given a prescription for azithromycin for exudative pharyngitis.  Strep was negative.  Patient is stable for discharge and requesting home. discharge instructions will be given.    Orders and Diagnoses  Diagnoses and all orders for this visit:  Exudative pharyngitis  -     azithromycin (Zithromax) 250 mg tablet; Take 1 tablet (250 mg) by mouth once daily for 6 doses. Take 2 tabs (500 mg) by mouth today, then take 1 tab daily for 4 days.  Sore throat  -     POCT rapid strep A manually resulted      Medical Admin Record      Patient disposition: Home    Electronically signed by Roberth Penny PA-C  7:08 PM

## 2025-02-02 LAB — S PYO DNA THROAT QL NAA+PROBE: NOT DETECTED

## 2025-02-25 ENCOUNTER — APPOINTMENT (OUTPATIENT)
Dept: BEHAVIORAL HEALTH | Facility: CLINIC | Age: 40
End: 2025-02-25
Payer: COMMERCIAL

## 2025-02-25 DIAGNOSIS — G47.01 INSOMNIA SECONDARY TO CHRONIC PAIN: ICD-10-CM

## 2025-02-25 DIAGNOSIS — F41.1 GAD (GENERALIZED ANXIETY DISORDER): ICD-10-CM

## 2025-02-25 DIAGNOSIS — F43.10 PTSD (POST-TRAUMATIC STRESS DISORDER): ICD-10-CM

## 2025-02-25 DIAGNOSIS — F43.10 PANIC ATTACK DUE TO POST TRAUMATIC STRESS DISORDER (PTSD): ICD-10-CM

## 2025-02-25 DIAGNOSIS — F41.0 PANIC ATTACK DUE TO POST TRAUMATIC STRESS DISORDER (PTSD): ICD-10-CM

## 2025-02-25 DIAGNOSIS — G89.29 INSOMNIA SECONDARY TO CHRONIC PAIN: ICD-10-CM

## 2025-02-25 DIAGNOSIS — F51.04 CHRONIC INSOMNIA: ICD-10-CM

## 2025-02-25 DIAGNOSIS — F44.5 PSYCHOGENIC NONEPILEPTIC SEIZURE: ICD-10-CM

## 2025-02-25 PROCEDURE — 99214 OFFICE O/P EST MOD 30 MIN: CPT | Performed by: STUDENT IN AN ORGANIZED HEALTH CARE EDUCATION/TRAINING PROGRAM

## 2025-02-25 RX ORDER — ESZOPICLONE 3 MG/1
3 TABLET, FILM COATED ORAL NIGHTLY
Qty: 30 TABLET | Refills: 0 | Status: SHIPPED | OUTPATIENT
Start: 2025-02-25 | End: 2025-03-27

## 2025-02-25 RX ORDER — PAROXETINE 30 MG/1
30 TABLET, FILM COATED ORAL EVERY MORNING
Qty: 30 TABLET | Refills: 1 | Status: SHIPPED | OUTPATIENT
Start: 2025-02-25 | End: 2025-03-27

## 2025-02-25 NOTE — PATIENT INSTRUCTIONS
Here are the references for Virtual IOP. Try to give them a call and see if you are able to attend.     [facility] ([phone]), [facility] ([phone])
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
## 2025-03-25 ENCOUNTER — APPOINTMENT (OUTPATIENT)
Dept: BEHAVIORAL HEALTH | Facility: CLINIC | Age: 40
End: 2025-03-25
Payer: COMMERCIAL

## 2025-03-25 DIAGNOSIS — F41.1 GAD (GENERALIZED ANXIETY DISORDER): ICD-10-CM

## 2025-03-25 DIAGNOSIS — F51.04 CHRONIC INSOMNIA: ICD-10-CM

## 2025-03-25 DIAGNOSIS — F44.5 PSYCHOGENIC NONEPILEPTIC SEIZURE: ICD-10-CM

## 2025-03-25 DIAGNOSIS — F41.0 PANIC ATTACK DUE TO POST TRAUMATIC STRESS DISORDER (PTSD): ICD-10-CM

## 2025-03-25 DIAGNOSIS — F43.10 PANIC ATTACK DUE TO POST TRAUMATIC STRESS DISORDER (PTSD): ICD-10-CM

## 2025-03-25 DIAGNOSIS — F43.10 PTSD (POST-TRAUMATIC STRESS DISORDER): ICD-10-CM

## 2025-03-25 DIAGNOSIS — G89.29 INSOMNIA SECONDARY TO CHRONIC PAIN: ICD-10-CM

## 2025-03-25 DIAGNOSIS — G47.01 INSOMNIA SECONDARY TO CHRONIC PAIN: ICD-10-CM

## 2025-03-25 PROCEDURE — 99214 OFFICE O/P EST MOD 30 MIN: CPT | Performed by: STUDENT IN AN ORGANIZED HEALTH CARE EDUCATION/TRAINING PROGRAM

## 2025-03-25 RX ORDER — ESZOPICLONE 3 MG/1
3 TABLET, FILM COATED ORAL NIGHTLY
Qty: 30 TABLET | Refills: 1 | Status: SHIPPED | OUTPATIENT
Start: 2025-03-25 | End: 2025-04-24

## 2025-03-25 RX ORDER — PAROXETINE 30 MG/1
30 TABLET, FILM COATED ORAL EVERY MORNING
Qty: 30 TABLET | Refills: 1 | Status: SHIPPED | OUTPATIENT
Start: 2025-03-25 | End: 2025-04-24
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
## 2025-04-07 ENCOUNTER — TELEPHONE (OUTPATIENT)
Dept: BEHAVIORAL HEALTH | Facility: CLINIC | Age: 40
End: 2025-04-07
Payer: COMMERCIAL

## 2025-04-11 ENCOUNTER — TELEPHONE (OUTPATIENT)
Dept: BEHAVIORAL HEALTH | Facility: CLINIC | Age: 40
End: 2025-04-11
Payer: COMMERCIAL

## 2025-04-11 DIAGNOSIS — F43.10 PANIC ATTACK DUE TO POST TRAUMATIC STRESS DISORDER (PTSD): ICD-10-CM

## 2025-04-11 DIAGNOSIS — F41.0 PANIC ATTACK DUE TO POST TRAUMATIC STRESS DISORDER (PTSD): ICD-10-CM

## 2025-04-11 DIAGNOSIS — F43.10 PTSD (POST-TRAUMATIC STRESS DISORDER): ICD-10-CM

## 2025-04-29 ENCOUNTER — APPOINTMENT (OUTPATIENT)
Dept: BEHAVIORAL HEALTH | Facility: CLINIC | Age: 40
End: 2025-04-29
Payer: COMMERCIAL

## 2025-04-29 DIAGNOSIS — G89.29 INSOMNIA SECONDARY TO CHRONIC PAIN: ICD-10-CM

## 2025-04-29 DIAGNOSIS — F41.0 PANIC ATTACK DUE TO POST TRAUMATIC STRESS DISORDER (PTSD): ICD-10-CM

## 2025-04-29 DIAGNOSIS — F51.04 CHRONIC INSOMNIA: ICD-10-CM

## 2025-04-29 DIAGNOSIS — G47.01 INSOMNIA SECONDARY TO CHRONIC PAIN: ICD-10-CM

## 2025-04-29 DIAGNOSIS — F43.10 PTSD (POST-TRAUMATIC STRESS DISORDER): ICD-10-CM

## 2025-04-29 DIAGNOSIS — F43.10 PANIC ATTACK DUE TO POST TRAUMATIC STRESS DISORDER (PTSD): ICD-10-CM

## 2025-04-29 PROCEDURE — 99214 OFFICE O/P EST MOD 30 MIN: CPT | Performed by: STUDENT IN AN ORGANIZED HEALTH CARE EDUCATION/TRAINING PROGRAM

## 2025-04-29 RX ORDER — ESZOPICLONE 3 MG/1
3 TABLET, FILM COATED ORAL NIGHTLY
Qty: 30 TABLET | Refills: 1 | Status: SHIPPED | OUTPATIENT
Start: 2025-04-29 | End: 2025-05-29

## 2025-04-29 RX ORDER — PAROXETINE HYDROCHLORIDE 20 MG/1
20 TABLET, FILM COATED ORAL EVERY MORNING
Qty: 30 TABLET | Refills: 1 | Status: SHIPPED | OUTPATIENT
Start: 2025-04-29 | End: 2026-04-29
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
## 2025-06-03 ENCOUNTER — APPOINTMENT (OUTPATIENT)
Dept: BEHAVIORAL HEALTH | Facility: CLINIC | Age: 40
End: 2025-06-03
Payer: COMMERCIAL

## 2025-07-14 DIAGNOSIS — D89.40 MAST CELL ACTIVATION SYNDROME: ICD-10-CM

## 2025-07-14 RX ORDER — CETIRIZINE HYDROCHLORIDE 10 MG/1
10 TABLET ORAL 2 TIMES DAILY
Qty: 180 TABLET | Refills: 0 | Status: SHIPPED | OUTPATIENT
Start: 2025-07-14 | End: 2025-10-12